# Patient Record
Sex: FEMALE | Race: WHITE | NOT HISPANIC OR LATINO | Employment: UNEMPLOYED | URBAN - METROPOLITAN AREA
[De-identification: names, ages, dates, MRNs, and addresses within clinical notes are randomized per-mention and may not be internally consistent; named-entity substitution may affect disease eponyms.]

---

## 2018-05-11 ENCOUNTER — DOCUMENTATION (OUTPATIENT)
Dept: BARIATRICS/WEIGHT MGMT | Facility: CLINIC | Age: 38
End: 2018-05-11

## 2018-05-11 RX ORDER — LEVOTHYROXINE SODIUM 0.03 MG/1
25 TABLET ORAL DAILY
COMMUNITY
End: 2018-05-22

## 2018-05-11 RX ORDER — FOLIC ACID 1 MG/1
1 TABLET ORAL DAILY
COMMUNITY
End: 2018-05-22

## 2018-05-11 RX ORDER — AMLODIPINE BESYLATE 10 MG/1
10 TABLET ORAL DAILY
COMMUNITY
End: 2018-05-22

## 2018-05-11 RX ORDER — FENOFIBRATE 54 MG/1
54 TABLET ORAL DAILY
COMMUNITY
End: 2018-05-22

## 2018-05-11 RX ORDER — LISINOPRIL AND HYDROCHLOROTHIAZIDE 25; 20 MG/1; MG/1
1 TABLET ORAL DAILY
COMMUNITY
End: 2018-05-22

## 2018-05-11 RX ORDER — ATORVASTATIN CALCIUM 20 MG/1
20 TABLET, FILM COATED ORAL DAILY
COMMUNITY
End: 2018-05-22

## 2018-05-11 RX ORDER — CYANOCOBALAMIN (VITAMIN B-12) 1000 MCG
500 TABLET, SUBLINGUAL SUBLINGUAL 3 TIMES DAILY
COMMUNITY

## 2018-05-11 RX ORDER — METFORMIN HYDROCHLORIDE 500 MG/1
500 TABLET, EXTENDED RELEASE ORAL
COMMUNITY
End: 2018-05-22

## 2018-05-11 RX ORDER — UREA 10 %
LOTION (ML) TOPICAL
COMMUNITY
End: 2018-05-22

## 2018-05-22 ENCOUNTER — DOCUMENTATION (OUTPATIENT)
Dept: BARIATRICS/WEIGHT MGMT | Facility: CLINIC | Age: 38
End: 2018-05-22

## 2018-05-22 ENCOUNTER — OFFICE VISIT (OUTPATIENT)
Dept: BARIATRICS/WEIGHT MGMT | Facility: CLINIC | Age: 38
End: 2018-05-22

## 2018-05-22 VITALS
HEART RATE: 74 BPM | RESPIRATION RATE: 18 BRPM | SYSTOLIC BLOOD PRESSURE: 98 MMHG | DIASTOLIC BLOOD PRESSURE: 64 MMHG | TEMPERATURE: 97.8 F | WEIGHT: 249 LBS | BODY MASS INDEX: 42.51 KG/M2 | HEIGHT: 64 IN | OXYGEN SATURATION: 99 %

## 2018-05-22 DIAGNOSIS — F32.A DEPRESSION, UNSPECIFIED DEPRESSION TYPE: ICD-10-CM

## 2018-05-22 DIAGNOSIS — R53.83 FATIGUE, UNSPECIFIED TYPE: ICD-10-CM

## 2018-05-22 DIAGNOSIS — R06.02 SHORTNESS OF BREATH: ICD-10-CM

## 2018-05-22 DIAGNOSIS — E06.3 HASHIMOTO'S DISEASE: ICD-10-CM

## 2018-05-22 DIAGNOSIS — F41.9 ANXIETY: ICD-10-CM

## 2018-05-22 DIAGNOSIS — E66.01 MORBID OBESITY WITH BMI OF 40.0-44.9, ADULT (HCC): ICD-10-CM

## 2018-05-22 DIAGNOSIS — K76.0 FATTY LIVER: ICD-10-CM

## 2018-05-22 DIAGNOSIS — R94.31 ABNORMAL ECG: Primary | ICD-10-CM

## 2018-05-22 DIAGNOSIS — M79.669 PAIN IN SHIN, UNSPECIFIED LATERALITY: ICD-10-CM

## 2018-05-22 DIAGNOSIS — R10.13 DYSPEPSIA: ICD-10-CM

## 2018-05-22 DIAGNOSIS — R06.00 DYSPNEA, UNSPECIFIED TYPE: Primary | ICD-10-CM

## 2018-05-22 DIAGNOSIS — Z87.891 FORMER SMOKER: ICD-10-CM

## 2018-05-22 DIAGNOSIS — R06.00 DYSPNEA, UNSPECIFIED TYPE: ICD-10-CM

## 2018-05-22 DIAGNOSIS — R73.09 ABNORMAL GLUCOSE: ICD-10-CM

## 2018-05-22 DIAGNOSIS — R29.818 SUSPECTED SLEEP APNEA: ICD-10-CM

## 2018-05-22 DIAGNOSIS — E04.9 THYROID GOITER: ICD-10-CM

## 2018-05-22 DIAGNOSIS — R12 HEARTBURN: ICD-10-CM

## 2018-05-22 PROCEDURE — 99204 OFFICE O/P NEW MOD 45 MIN: CPT | Performed by: PHYSICIAN ASSISTANT

## 2018-05-22 NOTE — PROGRESS NOTES
"Weight Loss Surgery  Presurgical Nutrition Assessment     Dusty Calhoun  05/22/2018  [id number]  [id number]  1980  female    Surgery desired: Sleeve Gastrectomy    Ht 161.3 cm (63.5 \"); Wt 113 kg (249 #); BMI 43.4  Past Medical History:   Diagnosis Date   • Abnormal ECG     followed by cardiology annually, negative workup up per patient.    • Abnormal glucose     Was on metformin for DM, d/c soda and A1C normalized.    • Anxiety    • Boil     h/o mulitple boils, never had I&D, no known MRSA   • Depression    • Dyspepsia    • Fatigue    • Fatty liver     noted on US, confirmed during lap jessika (no Biopsy)   • Former smoker    • Hashimoto's disease     last labs showed euthyroid without meds   • Heartburn     rare, doesn't take meds typically.  No h/o EGD or h pylori.    • Morbid obesity with BMI of 40.0-44.9, adult    • Pain in shin    • Shortness of breath    • Suspected sleep apnea     Has home oxygen testing, was on ngihttime oxygen in the past, has yet to have sleep study.    • Thyroid goiter     Followed by US annually, no procedures to date.      Past Surgical History:   Procedure Laterality Date   • LAPAROSCOPIC CHOLECYSTECTOMY  2014    cholelithiasis   • TUBAL ABDOMINAL LIGATION  2011    laparoscopic     No Known Allergies    Current Outpatient Prescriptions:   •  Cholecalciferol (VITAMIN D3) 2000 units capsule, Take 2,000 Units by mouth Daily., Disp: , Rfl:   •  vitamin B-12 (CYANOCOBALAMIN) 2500 MCG sublingual tablet tablet, Place  under the tongue Daily., Disp: , Rfl:       Nutrition Assessment    Estimated energy needs:  1800 kcal    Estimated calories for weight loss:  1300 kcal    IBW (Pounds):  145 #        Excess body weight (Pounds):  105 #       Nutrition Recall  24 Hour recall: (B) (L) (D) -  Reviewed and discussed with patient & her mother.  Usually eats no breakfast.  Gave up soda ~ 1 year ago. Drinks juice (from bottles /c no added sugar) and skim milk instead. Did not understand that the " sugar in juice is CHO and contributes to wt gain as does soda & sweet tea. Eats no brkfast. States that juggling the schedules of 3 children (oldest 15) makes it difficult to eat well.  Eats no brkfast.  Lunch was a Jr Whopper /c cheese & tyler.  9 pm dinner was 6 oz worth of chicken tenders & 5 potato wedges.         Exercise  daily - 30 minutes qd walking      Education    Provided information packet re:  Sleeve Gastrectomy  1. Reviewed guidelines for higher protein, limited carbohydrate diet to promote weight loss.  Encouraged patient to incorporate these principles of healthy eating from now until approximately 2 weeks prior to bariatric surgery date, when an even lower carbohydrate “liver-shrinking” regimen will be followed. (Information sheet re pre-op diet given).  Explained that after recovery from surgery this diet will again be followed to ensure further loss and for weight maintenance.    2. Encouraged patient to choose an acceptable protein supplement powder or shake for post-surgery liquid diet.  Provided product guidelines and examples.    3. Explained importance of goal setting to help in changing eating behaviors that are not conducive to weight loss.  Targeted several on a worksheet which also included spaces for patient to work on issues specific to them.  4. Provided follow-up options for support, including contact information for dietitians here, if desired.  Web-based support information and apps for smart phones and computers given.  Noted that monthly support group is offered at this clinic, and that support is associated with successful weight loss.    Recommend that team proceed with surgery and follow per protocol.      Nutrition Goals   Dietary Guidelines per information packet as described above  Protein goal:  grams per day   Carbohydrate goal:  100-140 grams per day  Eliminate soda, sweet tea, etc.     Exercise Goals  Continue current exercise routine   Add 15-30 minutes of  activity per day as tolerated      Shanta Joseph, SHEFALI  05/22/2018  4:28 PM

## 2018-05-22 NOTE — PROGRESS NOTES
[facility] BARIATRIC SURGERY  [address]  [facility] [id number] [phone]      Patient  Name:  Dusty Calhoun  :  1980      Date of Visit: 2018      Chief Complaint:  weight gain; unable to maintain weight loss    History of Present Illness:  Dusty Calhoun is a 37 y.o. female who presents today for evaluation, education and consultation regarding weight loss surgery. The patient is interested in sleeve gastrectomy.     Dusty has been overweight for at least 20 years, has been 35 pounds or more overweight for at least 15 years, has been 100 pounds or more overweight for 10 or more years and started dieting at age 13.      Previous diet attempts include: Calorie Counting; None; Amphetamines and Phenteramine.  The most weight Dusty lost was 40 pounds on  diet but was only able to maintain that weight loss for 6 months.  Her maximum lifetime weight is 249 pounds.    As above, patient has been overweight for many years, with numerous failed dietary/weight loss attempts.  She now has obesity related comorbidities and as such has decided to pursue weight loss surgery.    She is ready to make a life change to lose weight and better her health, gain mobility. Does know several people who have done well with the gastric sleeve.     Was diagnosed with HTN, HLD, DM, hashimotos and on several medications. She stopped drinking pop, lost 10lb and was taken off all these meds. Thyroid labs showed euthyroid off meds, BP has been controlled, cholesterol down and A1C normal.   Now only on vitamins.      GI history: occasional heartburn, does not require meds. Fatty liver noted on US and confirmed during lap jessika (no biopsy).      Did have abnormal ECG, advised to follow up with cardiology annually. Has had negative workup per patient.    Suspected sleep apnea with abnormal home pulse ox study, had been on oxygen at night but was non compliant. Plans to have overnight  sleep study in the future.     All past medical, surgical, social and family history have been obtained and discussed as pertinent to bariatric surgery as below.       Past Medical History:   Diagnosis Date   • Abnormal ECG     followed by cardiology annually, negative workup up per patient.    • Abnormal glucose     Was on metformin for DM, d/c soda and A1C normalized.    • Anxiety    • Boil     h/o mulitple boils, never had I&D, no known MRSA   • Depression    • Dyspepsia    • Fatigue    • Fatty liver     noted on US, confirmed during lap jessika (no Biopsy)   • Former smoker    • Hashimoto's disease     last labs showed euthyroid without meds   • Heartburn     rare, doesn't take meds typically.  No h/o EGD or h pylori.    • Morbid obesity with BMI of 40.0-44.9, adult    • Pain in shin    • Shortness of breath    • Suspected sleep apnea     Has home oxygen testing, was on ngihttime oxygen in the past, has yet to have sleep study.    • Thyroid goiter     Followed by US annually, no procedures to date.      Past Surgical History:   Procedure Laterality Date   • LAPAROSCOPIC CHOLECYSTECTOMY  2014    cholelithiasis   • TUBAL ABDOMINAL LIGATION  2011    laparoscopic       No Known Allergies    Current Outpatient Prescriptions:   •  Cholecalciferol (VITAMIN D3) 2000 units capsule, Take 2,000 Units by mouth Daily., Disp: , Rfl:   •  vitamin B-12 (CYANOCOBALAMIN) 2500 MCG sublingual tablet tablet, Place  under the tongue Daily., Disp: , Rfl:     Social History     Social History   • Marital status: Single     Spouse name: N/A   • Number of children: 3   • Years of education: High School      Occupational History   • Student      Social History Main Topics   • Smoking status: Former Smoker     Packs/day: 2.00     Types: Cigarettes   • Smokeless tobacco: Never Used   • Alcohol use No   • Drug use: No   • Sexual activity: Not on file      Comment: no hormones     Other Topics Concern   • Not on file     Social History  Narrative    Lives in [facility] with 3 children.  Not working, in school for associates in Science and Art.      Family History   Problem Relation Age of Onset   • Hypertension Mother    • Hypertension Father    • Diabetes Maternal Grandmother    • Obesity Maternal Grandmother    • Heart attack Maternal Grandfather    • Breast cancer Paternal Grandmother        Review of Systems:  Constitutional:  Reports fatigue, weight gain and denies fevers, chills.  HEENT:  denies headache, ear pain or loss of hearing, blurred or double vision, nasal discharge or sore throat.does note floaters intermittently.   Cardiovascular:  Reports edema, abnormal ECG and denies HTN, HLD, CAD, Atrial Fib, hx heart disease, heart murmur, hx MI, chest pain, palpitations, hx DVT.  Respiratory:  Reports shortness of breath  and denies dyspnea on exertion, cough , wheezing, sleep apnea, asthma, hx PE.  Gastrointestinal:  Reports heartburn, constipation, gallbladder issues, liver disease and denies dysphagia, nausea, vomiting, abdominal pain, IBS, diarrhea, melena, blood in stool, hx pancreatitis.  Genitourinary:  Reports stress incontinence and denies hematuria, dysuria, polyuria, polydipsia, renal insufficiency, renal failure.    Musculoskeletal:  Reports shin pain and denies joint pain, fibromyalgia, arthritis and autoimmune disease.  Neurological:  Reports none and denies headaches, migraines, numbness /tingling, dizziness, confusion, seizure, stroke.  Psychiatric:  Reports anxiety, depression and denies bipolar disorder, hx suicide attempt, hx self injury, eating disorder.  Endocrine:  Reports diabetes, thyroid disease and denies gout.  Hematologic:  Reports none and denies anemia, bleeding disorder, hx blood transfusion.  Skin: Occasional boils, no known MRSA.       Physical Exam:  Vital Signs:  Weight: 113 kg (249 lb)   Body mass index is 43.42 kg/m².  Temp: 97.8 °F (36.6 °C)   Heart Rate: 74   BP: 98/64     Physical Exam    Constitutional: She is oriented to person, place, and time. She appears well-developed and well-nourished.   HENT:   Head: Normocephalic and atraumatic.   Mouth/Throat: Oropharynx is clear and moist.   Eyes: EOM are normal.   exophthalmos    Neck: Normal range of motion. Neck supple. Thyromegaly present.   Cardiovascular: Normal rate, regular rhythm and normal heart sounds.    Pulmonary/Chest: Effort normal and breath sounds normal. No respiratory distress. She has no wheezes.   Abdominal: Soft. Bowel sounds are normal. She exhibits no distension. There is no tenderness.   Lap scars.    Musculoskeletal: Normal range of motion.   Neurological: She is alert and oriented to person, place, and time.   Skin: Skin is warm and dry.   Psychiatric: She has a normal mood and affect. Her behavior is normal. Judgment and thought content normal.   Vitals reviewed.      Patient Active Problem List   Diagnosis   • Hashimoto's disease   • Thyroid goiter   • Fatty liver   • Dyspepsia   • Fatigue   • Morbid obesity with BMI of 40.0-44.9, adult   • Pain in shin   • Shortness of breath   • Heartburn   • Abnormal ECG   • Anxiety   • Depression   • Boil   • Former smoker   • Suspected sleep apnea   • Abnormal glucose       Assessment:    Dusty Calhoun is a 37 y.o. year old female with medically complicated obesity pursuing sleeve gastrectomy.    Weight loss surgery is deemed medically necessary given the following obesity related comorbidities including sleep disturbances with possible sleep apnea, diabetes, GERD, gall bladder disease and depression with current Weight: 113 kg (249 lb) and Body mass index is 43.42 kg/m²..    Plan:  The consultation plan and program requirements were reviewed with the patient.  The patient has been advised that a letter of medical support must be obtained from her primary care physician or referring provider. A psychological evaluation will be arranged.  A nutritional evaluation will be performed.  The  patient was advised to start a high protein and low carbohydrate diet.  Necessary lifestyle modifications were discussed.  Instructions on how to access SMILEY was given to the patient.  SMILEY is an internet based educational video that explains the surgical procedure chosen and answers basic questions regarding that procedure.     Preoperative testing will include: CBC, CMP, Lipids, TSH, HgA1C, H.Pylori, Pulmonary Function Testing, CXR, EKG and EGD     Additional preop clearances required prior to surgery: Cardiac.      The patient has been educated on expected postoperative lifestyle changes, including commitment to high protein diet, vitamin regimen, and exercise program.  They are aware that support groups are encouraged for optimal weight loss results. Patient understands that bariatric surgery is not cosmetic surgery but rather a tool to help make a lifelong commitment to lifestyle changes including diet, exercise, behavior modifications, and healthy habits. The procedure was discussed with the patient and all questions were answered. The importance of avoiding ASA/ NSAIDS/ steroids/ tobacco/ hormones/ immunomodulators perioperatively was discussed.         Marielena Thao PA-C

## 2018-05-23 LAB
ALBUMIN SERPL-MCNC: 4.8 G/DL (ref 3.5–5.5)
ALBUMIN/GLOB SERPL: 1.8 {RATIO} (ref 1.2–2.2)
ALP SERPL-CCNC: 69 IU/L (ref 39–117)
ALT SERPL-CCNC: 27 IU/L (ref 0–32)
AST SERPL-CCNC: 40 IU/L (ref 0–40)
BILIRUB SERPL-MCNC: 0.6 MG/DL (ref 0–1.2)
BUN SERPL-MCNC: 12 MG/DL (ref 6–20)
BUN/CREAT SERPL: 10 (ref 9–23)
CALCIUM SERPL-MCNC: 9.5 MG/DL (ref 8.7–10.2)
CHLORIDE SERPL-SCNC: 100 MMOL/L (ref 96–106)
CHOLEST SERPL-MCNC: 247 MG/DL (ref 100–199)
CO2 SERPL-SCNC: 25 MMOL/L (ref 18–29)
CREAT SERPL-MCNC: 1.15 MG/DL (ref 0.57–1)
ERYTHROCYTE [DISTWIDTH] IN BLOOD BY AUTOMATED COUNT: 14.7 % (ref 12.3–15.4)
GFR SERPLBLD CREATININE-BSD FMLA CKD-EPI: 62 ML/MIN/1.73
GFR SERPLBLD CREATININE-BSD FMLA CKD-EPI: 71 ML/MIN/1.73
GLOBULIN SER CALC-MCNC: 2.6 G/DL (ref 1.5–4.5)
GLUCOSE SERPL-MCNC: 108 MG/DL (ref 65–99)
HBA1C MFR BLD: 5.5 % (ref 4.8–5.6)
HCT VFR BLD AUTO: 41.8 % (ref 34–46.6)
HDLC SERPL-MCNC: 43 MG/DL
HGB BLD-MCNC: 14.2 G/DL (ref 11.1–15.9)
LDLC SERPL CALC-MCNC: 168 MG/DL (ref 0–99)
MCH RBC QN AUTO: 29.8 PG (ref 26.6–33)
MCHC RBC AUTO-ENTMCNC: 34 G/DL (ref 31.5–35.7)
MCV RBC AUTO: 88 FL (ref 79–97)
PLATELET # BLD AUTO: 333 X10E3/UL (ref 150–379)
POTASSIUM SERPL-SCNC: 4.8 MMOL/L (ref 3.5–5.2)
PROT SERPL-MCNC: 7.4 G/DL (ref 6–8.5)
RBC # BLD AUTO: 4.76 X10E6/UL (ref 3.77–5.28)
SODIUM SERPL-SCNC: 142 MMOL/L (ref 134–144)
TRIGL SERPL-MCNC: 182 MG/DL (ref 0–149)
TSH SERPL DL<=0.005 MIU/L-ACNC: 96.1 UIU/ML (ref 0.45–4.5)
UREA BREATH TEST QL: NEGATIVE
VLDLC SERPL CALC-MCNC: 36 MG/DL (ref 5–40)
WBC # BLD AUTO: 9.6 X10E3/UL (ref 3.4–10.8)

## 2018-07-23 ENCOUNTER — OFFICE VISIT (OUTPATIENT)
Dept: BARIATRICS/WEIGHT MGMT | Facility: CLINIC | Age: 38
End: 2018-07-23

## 2018-07-23 VITALS
RESPIRATION RATE: 18 BRPM | BODY MASS INDEX: 41.23 KG/M2 | HEART RATE: 84 BPM | OXYGEN SATURATION: 99 % | SYSTOLIC BLOOD PRESSURE: 122 MMHG | HEIGHT: 64 IN | DIASTOLIC BLOOD PRESSURE: 100 MMHG | TEMPERATURE: 98.4 F | WEIGHT: 241.5 LBS

## 2018-07-23 DIAGNOSIS — E66.01 OBESITY, CLASS III, BMI 40-49.9 (MORBID OBESITY) (HCC): ICD-10-CM

## 2018-07-23 DIAGNOSIS — R12 HEARTBURN: Primary | ICD-10-CM

## 2018-07-23 PROCEDURE — 99214 OFFICE O/P EST MOD 30 MIN: CPT | Performed by: PHYSICIAN ASSISTANT

## 2018-07-23 RX ORDER — LEVOTHYROXINE SODIUM 0.05 MG/1
50 TABLET ORAL DAILY
COMMUNITY
End: 2019-02-25

## 2018-07-23 RX ORDER — ESCITALOPRAM OXALATE 10 MG/1
10 TABLET ORAL DAILY
COMMUNITY
End: 2019-03-10

## 2018-07-23 RX ORDER — LISINOPRIL 10 MG/1
10 TABLET ORAL DAILY
COMMUNITY
End: 2019-10-22

## 2018-07-23 NOTE — PROGRESS NOTES
[facility] BARIATRIC SURGERY  [address]  [facility] [id number] [phone]      Patient  Name:  Dusty Calhoun  :  1980      Date of Visit: 2018      Chief Complaint:  weight gain; unable to maintain weight loss, heartburn, diet visit #2    History of Present Illness:  Dusty Calhoun is a 38 y.o. female who presents today for evaluation of heartburn prior to sleeve gastrectomy as well as diet visit.      Since LOV, was started on levothyroxine 50mcg for TSH of 96.  Also started on lisinopril for HTN. PCP plans to recheck labs in 3 months.     GI history: occasional heartburn, does not require meds. Fatty liver noted on US and confirmed during lap jessika (no biopsy).   H pylori was negative with recent UBT. No h/o EGD.Continues with occasional heartburn. Denies n/v/abd pain, dysphagia. Has chronic constipation.   No change in other medical history.     Focusing on increasing protein and decreasing carbs, cutting out juice and still not drinking pop.  Drinking 64oz+ daily. Eating 2-3 meals a day, drinking protein shake for breakfast.  Lunch/ dinner: making substitutions such as using olive oil, eating a lot of chicken breasts.  . Eating turkey roll ups and fruits for snacks. Not tracking intake.  Exercising: walking/ treadmill.           Past Medical History:   Diagnosis Date   • Abnormal ECG     followed by cardiology annually, negative workup up per patient.    • Abnormal glucose     Was on metformin for DM, d/c soda and A1C normalized.    • Anxiety    • Boil     h/o mulitple boils, never had I&D, no known MRSA   • Depression    • Dyspepsia    • Fatigue    • Fatty liver     noted on US, confirmed during lap jessika (no Biopsy)   • Former smoker    • Hashimoto's disease     last labs showed euthyroid without meds   • Heartburn     rare, doesn't take meds typically.  No h/o EGD or h pylori.    • Hypertension    • Morbid obesity with BMI of 40.0-44.9, adult (CMS/[facility])    •  Pain in shin    • Shortness of breath    • Suspected sleep apnea     Has home oxygen testing, was on ngihttime oxygen in the past, has yet to have sleep study.    • Thyroid goiter     Followed by US annually, no procedures to date.      Past Surgical History:   Procedure Laterality Date   • LAPAROSCOPIC CHOLECYSTECTOMY  2014    cholelithiasis   • TUBAL ABDOMINAL LIGATION  2011    laparoscopic       No Known Allergies    Current Outpatient Prescriptions:   •  Cholecalciferol (VITAMIN D3) 2000 units capsule, Take 5,000 Units by mouth Daily., Disp: , Rfl:   •  escitalopram (LEXAPRO) 10 MG tablet, Take 10 mg by mouth Daily., Disp: , Rfl:   •  levothyroxine (SYNTHROID, LEVOTHROID) 50 MCG tablet, Take 50 mcg by mouth Daily., Disp: , Rfl:   •  lisinopril (PRINIVIL,ZESTRIL) 10 MG tablet, Take 10 mg by mouth Daily., Disp: , Rfl:   •  vitamin B-12 (CYANOCOBALAMIN) 2500 MCG sublingual tablet tablet, Place 500 mcg under the tongue 3 (Three) Times a Day., Disp: , Rfl:     Social History     Social History   • Marital status: Single     Spouse name: N/A   • Number of children: 3   • Years of education: High School      Occupational History   • Student      Social History Main Topics   • Smoking status: Former Smoker     Packs/day: 2.00     Types: Cigarettes   • Smokeless tobacco: Never Used   • Alcohol use No   • Drug use: No   • Sexual activity: Not on file      Comment: no hormones     Other Topics Concern   • Not on file     Social History Narrative    Lives in [facility] with 3 children.  Not working, in school for associates in Science and Art.      Family History   Problem Relation Age of Onset   • Hypertension Mother    • Hypertension Father    • Diabetes Maternal Grandmother    • Obesity Maternal Grandmother    • Heart attack Maternal Grandfather    • Breast cancer Paternal Grandmother        Review of Systems:  Constitutional:  Reports fatigue, weight gain and denies fevers, chills.  HEENT:  denies headache, ear pain  or loss of hearing, blurred or double vision, nasal discharge or sore throat.does note floaters intermittently.   Cardiovascular:  Reports edema, abnormal ECG and denies HTN, HLD, CAD, Atrial Fib, hx heart disease, heart murmur, hx MI, chest pain, palpitations, hx DVT.  Respiratory:  Reports shortness of breath  and denies dyspnea on exertion, cough , wheezing, sleep apnea, asthma, hx PE.  Gastrointestinal:  Reports heartburn, constipation, gallbladder issues, liver disease and denies dysphagia, nausea, vomiting, abdominal pain, IBS, diarrhea, melena, blood in stool, hx pancreatitis.  Genitourinary:  Reports stress incontinence and denies hematuria, dysuria, polyuria, polydipsia, renal insufficiency, renal failure.    Musculoskeletal:  Reports shin pain and denies joint pain, fibromyalgia, arthritis and autoimmune disease.  Neurological:  Reports none and denies headaches, migraines, numbness /tingling, dizziness, confusion, seizure, stroke.  Psychiatric:  Reports anxiety, depression and denies bipolar disorder, hx suicide attempt, hx self injury, eating disorder.  Endocrine:  Reports diabetes, thyroid disease and denies gout.  Hematologic:  Reports none and denies anemia, bleeding disorder, hx blood transfusion.  Skin: Occasional boils, no known MRSA.     ROS with no change on today's visit.     Physical Exam:  Vital Signs:  Weight: 110 kg (241 lb 8 oz)   Body mass index is 42.11 kg/m².  Temp: 98.4 °F (36.9 °C)   Heart Rate: 84   BP: 122/100     Physical Exam   Constitutional: She is oriented to person, place, and time. She appears well-developed and well-nourished.   HENT:   Head: Normocephalic and atraumatic.   Mouth/Throat: Oropharynx is clear and moist.   exophthalmos   Eyes: EOM are normal.   Neck: Normal range of motion. Neck supple. Thyromegaly present.   Cardiovascular: Normal rate, regular rhythm and normal heart sounds.    Pulmonary/Chest: Effort normal and breath sounds normal. No respiratory distress.  She has no wheezes.   Abdominal: Soft. Bowel sounds are normal. She exhibits no distension. There is no tenderness.   Lap scars   Musculoskeletal: Normal range of motion.   Neurological: She is alert and oriented to person, place, and time.   Skin: Skin is warm and dry.   Psychiatric: She has a normal mood and affect. Her behavior is normal.   Vitals reviewed.      Patient Active Problem List   Diagnosis   • Hashimoto's disease   • Thyroid goiter   • Fatty liver   • Dyspepsia   • Fatigue   • Morbid obesity with BMI of 40.0-44.9, adult (CMS/[facility])   • Pain in shin   • Shortness of breath   • Heartburn   • Abnormal ECG   • Anxiety   • Depression   • Boil   • Former smoker   • Suspected sleep apnea   • Abnormal glucose       Assessment:    Dusty Calhoun is a 38 y.o. year old female with medically complicated obesity pursuing sleeve gastrectomy.    Weight loss surgery is deemed medically necessary given the following obesity related comorbidities including sleep disturbances with possible sleep apnea, diabetes, GERD, gall bladder disease and depression with current Weight: 110 kg (241 lb 8 oz) and Body mass index is 42.11 kg/m²..      ICD-10-CM ICD-9-CM   1. Heartburn R12 787.1   2. Obesity, Class III, BMI 40-49.9 (morbid obesity) (CMS/[facility]) E66.01 278.01         Plan:      During diet appointments the patient is educated on high quality nutrition and habits to facilitate good health and possibly some weight loss. Necessary lifestyle changes and behavior modifications were discussed. Please note, the patient remains compliant in completing her diet requirements.     Goals:   1. Continue to be mindful of healthy food choices, encouraged 3 meals a day with snack betwee- healthy options  2. Increase daily protein intake and reduce carbs, goal of protein 70-100g daily, start tracking intake.   3. Increase daily exercise/activity as able.- 30 min, 3 times a day.     Proceed with EGD for evaluation of heartburn.       Marielena  VIPIN Thao PA-C

## 2018-07-26 ENCOUNTER — LAB REQUISITION (OUTPATIENT)
Dept: LAB | Facility: HOSPITAL | Age: 38
End: 2018-07-26

## 2018-07-26 ENCOUNTER — OUTSIDE FACILITY SERVICE (OUTPATIENT)
Dept: BARIATRICS/WEIGHT MGMT | Facility: CLINIC | Age: 38
End: 2018-07-26

## 2018-07-26 DIAGNOSIS — R12 HEARTBURN: ICD-10-CM

## 2018-07-26 PROCEDURE — 88305 TISSUE EXAM BY PATHOLOGIST: CPT | Performed by: SURGERY

## 2018-07-26 PROCEDURE — 43239 EGD BIOPSY SINGLE/MULTIPLE: CPT | Performed by: SURGERY

## 2018-07-27 DIAGNOSIS — R12 HEARTBURN: ICD-10-CM

## 2018-07-27 LAB
CYTO UR: NORMAL
LAB AP CASE REPORT: NORMAL
LAB AP CLINICAL INFORMATION: NORMAL
PATH REPORT.FINAL DX SPEC: NORMAL
PATH REPORT.GROSS SPEC: NORMAL

## 2019-02-21 DIAGNOSIS — R53.83 FATIGUE, UNSPECIFIED TYPE: ICD-10-CM

## 2019-02-21 DIAGNOSIS — R06.00 DYSPNEA, UNSPECIFIED TYPE: Primary | ICD-10-CM

## 2019-02-21 DIAGNOSIS — R06.00 DYSPNEA, UNSPECIFIED TYPE: ICD-10-CM

## 2019-02-25 ENCOUNTER — CONSULT (OUTPATIENT)
Dept: BARIATRICS/WEIGHT MGMT | Facility: CLINIC | Age: 39
End: 2019-02-25

## 2019-02-25 ENCOUNTER — PREP FOR SURGERY (OUTPATIENT)
Dept: OTHER | Facility: HOSPITAL | Age: 39
End: 2019-02-25

## 2019-02-25 VITALS
DIASTOLIC BLOOD PRESSURE: 74 MMHG | HEART RATE: 78 BPM | OXYGEN SATURATION: 99 % | WEIGHT: 238.5 LBS | BODY MASS INDEX: 40.72 KG/M2 | SYSTOLIC BLOOD PRESSURE: 124 MMHG | HEIGHT: 64 IN | TEMPERATURE: 98.3 F | RESPIRATION RATE: 18 BRPM

## 2019-02-25 DIAGNOSIS — R53.83 FATIGUE, UNSPECIFIED TYPE: Primary | ICD-10-CM

## 2019-02-25 DIAGNOSIS — F32.A DEPRESSION, UNSPECIFIED DEPRESSION TYPE: ICD-10-CM

## 2019-02-25 DIAGNOSIS — R12 HEARTBURN: ICD-10-CM

## 2019-02-25 DIAGNOSIS — E66.01 OBESITY, CLASS III, BMI 40-49.9 (MORBID OBESITY) (HCC): ICD-10-CM

## 2019-02-25 DIAGNOSIS — E66.01 OBESITY, CLASS III, BMI 40-49.9 (MORBID OBESITY) (HCC): Primary | ICD-10-CM

## 2019-02-25 DIAGNOSIS — R29.818 SUSPECTED SLEEP APNEA: ICD-10-CM

## 2019-02-25 DIAGNOSIS — K76.0 FATTY LIVER: ICD-10-CM

## 2019-02-25 PROCEDURE — 99215 OFFICE O/P EST HI 40 MIN: CPT | Performed by: SURGERY

## 2019-02-25 RX ORDER — SCOLOPAMINE TRANSDERMAL SYSTEM 1 MG/1
1 PATCH, EXTENDED RELEASE TRANSDERMAL ONCE
Status: CANCELLED | OUTPATIENT
Start: 2019-02-25 | End: 2019-02-25

## 2019-02-25 RX ORDER — CEFAZOLIN SODIUM 2 G/100ML
2 INJECTION, SOLUTION INTRAVENOUS
Status: CANCELLED | OUTPATIENT
Start: 2019-02-25

## 2019-02-25 RX ORDER — CHLORHEXIDINE GLUCONATE 0.12 MG/ML
15 RINSE ORAL
Status: CANCELLED | OUTPATIENT
Start: 2019-02-25 | End: 2019-02-25

## 2019-02-25 RX ORDER — PANTOPRAZOLE SODIUM 40 MG/10ML
40 INJECTION, POWDER, LYOPHILIZED, FOR SOLUTION INTRAVENOUS ONCE
Status: CANCELLED | OUTPATIENT
Start: 2019-02-25 | End: 2019-02-25

## 2019-02-25 RX ORDER — SODIUM CHLORIDE 9 MG/ML
150 INJECTION, SOLUTION INTRAVENOUS CONTINUOUS
Status: CANCELLED | OUTPATIENT
Start: 2019-02-25

## 2019-02-25 NOTE — PROGRESS NOTES
[facility] GROUP BARIATRIC SURGERY  [address]  [facility] [id number] [phone]      Patient  Name:  Dusty Calhoun  :  1980      Date of Visit: 2019      Chief Complaint:  weight gain; unable to maintain weight loss    History of Present Illness:  Dusty Calhoun is a 38 y.o. female who presents today for evaluation, education and consultation regarding weight loss surgery.     Patient has been overweight for many years, with numerous failed dietary/weight loss attempts.  She now has chronic stable obesity related comorbidities of fatty liver, GERD, fatigue, depression, suspected sleep apnea and as such has decided to pursue weight loss surgery.    Hx abnormal EKG, sees cardiologist yearly and normal stress test yearly.      Previously DM2, but stopped drinking mountain dew 20 oz x 6 per day and came off of metformin with improvement in A1C.      Some heartburn in the past, very infrequent, no meds.  Supposed to use O2 at home, does not.  A home O2 sat study showed sleep apnea.  They did not recommend CPAP machine.      No longer takes Lexapro.      Original intake  18      Review of data:    ARASH: nothing  CBC: nl  CMP: nl  EGD: PQ 37 cm, small HH, DE reflux  HP neg  Cardiac clearance: no contraindication  Echo: 2016 normal  Stress test: 2016 normal  PFTs: mild obstructive  EKG: nl  CXR: nl      Last tobacco: 7 year ago  Last NSAIDs: >1 month  Last ASA: n/a  Last steroids: n/a  Last hormones: n/a      Past Medical History:   Diagnosis Date   • Abnormal ECG     followed by cardiology annually, negative workup up per patient.    • Abnormal glucose     Was on metformin for DM, d/c soda and A1C normalized.    • Anxiety    • Boil     h/o mulitple boils, never had I&D, no known MRSA   • Depression    • Dyspepsia    • Fatigue    • Fatty liver     noted on US, confirmed during lap jessika (no Biopsy)   • Former smoker    • Hashimoto's disease     last labs showed  euthyroid without meds   • Heartburn     rare, doesn't take meds typically.  No h/o EGD or h pylori.    • Hypertension    • Morbid obesity with BMI of 40.0-44.9, adult (CMS/HCC)    • Pain in shin    • Shortness of breath    • Suspected sleep apnea     Has home oxygen testing, was on ngihttime oxygen in the past, has yet to have sleep study.    • Thyroid goiter     Followed by US annually, no procedures to date.      Past Surgical History:   Procedure Laterality Date   • LAPAROSCOPIC CHOLECYSTECTOMY  2014    cholelithiasis   • TUBAL ABDOMINAL LIGATION  2011    laparoscopic       No Known Allergies    Current Outpatient Medications:   •  Cholecalciferol (VITAMIN D3) 2000 units capsule, Take 5,000 Units by mouth Daily., Disp: , Rfl:   •  escitalopram (LEXAPRO) 10 MG tablet, Take 10 mg by mouth Daily., Disp: , Rfl:   •  lisinopril (PRINIVIL,ZESTRIL) 10 MG tablet, Take 10 mg by mouth Daily., Disp: , Rfl:   •  vitamin B-12 (CYANOCOBALAMIN) 2500 MCG sublingual tablet tablet, Place 500 mcg under the tongue 3 (Three) Times a Day., Disp: , Rfl:     Social History     Socioeconomic History   • Marital status: Single     Spouse name: Not on file   • Number of children: 3   • Years of education: High School    • Highest education level: Not on file   Social Needs   • Financial resource strain: Not on file   • Food insecurity - worry: Not on file   • Food insecurity - inability: Not on file   • Transportation needs - medical: Not on file   • Transportation needs - non-medical: Not on file   Occupational History   • Occupation: Student   Tobacco Use   • Smoking status: Former Smoker     Packs/day: 2.00     Types: Cigarettes   • Smokeless tobacco: Never Used   Substance and Sexual Activity   • Alcohol use: No   • Drug use: No   • Sexual activity: Not on file     Comment: no hormones   Other Topics Concern   • Not on file   Social History Narrative    Lives in [facility] with 3 children.  Not working, in school for associates in  Science and Art.      Family History   Problem Relation Age of Onset   • Hypertension Mother    • Hypertension Father    • Diabetes Maternal Grandmother    • Obesity Maternal Grandmother    • Heart attack Maternal Grandfather    • Breast cancer Paternal Grandmother        Review of Systems   Constitutional: Positive for fatigue and unexpected weight gain. Negative for chills, diaphoresis, fever and unexpected weight loss.   HENT: Negative for congestion, ear pain, facial swelling, rhinorrhea and sore throat.    Eyes: Negative for blurred vision, double vision and discharge.   Respiratory: Positive for shortness of breath. Negative for chest tightness, wheezing and stridor.    Cardiovascular: Negative for chest pain, palpitations and leg swelling.   Gastrointestinal: Negative for abdominal pain, blood in stool and vomiting.   Endocrine: Negative for polydipsia.   Genitourinary: Negative for hematuria.   Musculoskeletal: Positive for arthralgias. Negative for neck pain.   Skin: Negative for color change and rash.   Allergic/Immunologic: Negative for immunocompromised state.   Neurological: Negative for confusion.   Psychiatric/Behavioral: Negative for self-injury.       Physical Exam:  Vital Signs:  Weight: 108 kg (238 lb 8 oz)   Body mass index is 41.59 kg/m².  Temp: 98.3 °F (36.8 °C)   Heart Rate: 78   BP: 124/74     Physical Exam   Constitutional: She is oriented to person, place, and time. She appears well-developed and well-nourished.   HENT:   Head: Normocephalic and atraumatic.   Nose: Nose normal.   Eyes: Conjunctivae and EOM are normal. Pupils are equal, round, and reactive to light.   Neck: Normal range of motion. Neck supple. Carotid bruit is not present. No tracheal deviation present.   Thyroid slightly full on the left   Cardiovascular: Normal rate, regular rhythm and normal heart sounds.   Pulmonary/Chest: Effort normal and breath sounds normal. No respiratory distress.   Abdominal: Soft. She exhibits no  distension. There is no hepatosplenomegaly. There is no tenderness.   Scattered lap scars   Musculoskeletal: Normal range of motion. She exhibits no edema or deformity.   Neurological: She is alert and oriented to person, place, and time. No cranial nerve deficit. Coordination normal.   Skin: Skin is warm and dry. No rash noted.   Psychiatric: She has a normal mood and affect. Her behavior is normal. Judgment and thought content normal.   Vitals reviewed.      Patient Active Problem List   Diagnosis   • Hashimoto's disease   • Thyroid goiter   • Fatty liver   • Dyspepsia   • Fatigue   • Morbid obesity with BMI of 40.0-44.9, adult (CMS/HCC)   • Pain in shin   • Shortness of breath   • Heartburn   • Abnormal ECG   • Anxiety   • Depression   • Boil   • Former smoker   • Suspected sleep apnea   • Abnormal glucose       Assessment:    Dusty Calhoun is a 38 y.o. year old female with medically complicated obesity.    Weight loss surgery is deemed medically necessary given the following obesity related comorbidities including fatty liver, GERD, fatigue, depression, suspected sleep apnea  with current Weight: 108 kg (238 lb 8 oz) and Body mass index is 41.59 kg/m²..    Patient is aware that surgery is a tool, and that weight loss is not guaranteed but only seen in the context of appropriate use, follow up and exercise.    The patient was present for an approximately a 2.5 hour discussion of the purpose of weight loss surgery, how WLS is a tool to assist in achieving weight loss goals, the most common complications and how best to avoid them, and the strategies for short and long term weight loss.  Ample opportunity to discuss questions was available both in group and during the time of individual examination.    I reviewed all available preop labs, Xrays, tests, clearances, etc and signed off on these in the record.  All of this in addition to the patient's unique history and exam has been taken into consideration in  "determining their appropriate candidacy for weight loss surgery.    Complications  of laparoscopic/possible robotic gastric sleeve were discussed. The patient is well aware of the potential complications of surgery that include but not limited to bleeding, infections, deep venous thrombosis, pulmonary embolism, pulmonary complications such as pneumonia, cardiac events, hernias, small bowel obstruction, damage to the spleen or other organs, bowel injury, disfiguring scars, failure to lose weight, need for additional surgery, conversion to an open procedure, and death. Patient is also aware of complications which apply in this particular procedure that can include but are not limited to a \"leak\" at the staple line which in some instances may require conversion to gastric bypass.    The patient is aware if a hiatal hernia is encountered, it likely will be repaired.  R/B/A Rx to hiatal hernia repair were discussed as outlined in our long consent form.  Briefly risks in addition to those for LSG include recurrent hernia, RACHAEL, dysphagia, esophageal injury, pneumothorax, injury to the vagus nerves, injury to the thoracic duct, aorta or vena cava.    Greater than 3 minutes was spent with the patient discussing avoiding all tobacco products and second hand smoke at least 2 weeks pre-operatively and 6 weeks post-operatively to minimize the risk of sleeve leak.  This included discussing the importance of avoiding even secondhand smoke as the risk of leak is increased.  Examples discussed:  I made it very clear that the patient understands they should avoid even riding in a car where someone has previously smoked in the last 2 weeks, living in a house where someone smokes (even if it's in a separate room/patio/attached garage, etc.) we discussed that they should not have a conversation with a group of people who are smoking even if it's outside.  They can be around wood burning fires and barbecue.  I told them I do not know if " marijuana has a same effects but my overall recommendation is to avoid it for 2 weeks prior in 6 weeks after surgery.  They also are aware that nicotine may also increase the risk of leak and I strongly encouraged him to avoid that as well for 2 weeks prior in 6 weeks after surgery.    Discussed the risks, benefits and alternative therapies at great length as outlined in our extensive consent forms, consent videos, and educational teaching process under the direction of the center's .    A copy of the patient's signed informed consent is on file.    Plan:  Laparoscopic sleeve gastrectomy + HHR at  [facility].   Probably undiagnosed sleep apnea.      R/B/A Rx discussed to postop anticoagulation incl but not limited to bleeding, drug reaction, venothromboembolic events, etc. and she declined.  No personal or family hx of VTE, no hx MI.      MDM high:  Elective procedure with the following risk factors: morbid obesity  4+ chronic medical problems reviewed.        Joselin Fish MD

## 2019-02-25 NOTE — H&P
[facility] GROUP BARIATRIC SURGERY  [address]  [facility] [address] [phone]      Patient  Name:  Dusty Calhoun  :  1980      Date of Visit: 2019      Chief Complaint:  weight gain; unable to maintain weight loss    History of Present Illness:  Dusty Calhoun is a 38 y.o. female who presents today for evaluation, education and consultation regarding weight loss surgery.     Patient has been overweight for many years, with numerous failed dietary/weight loss attempts.  She now has chronic stable obesity related comorbidities of fatty liver, GERD, fatigue, depression, suspected sleep apnea and as such has decided to pursue weight loss surgery.    Hx abnormal EKG, sees cardiologist yearly and normal stress test yearly.      Previously DM2, but stopped drinking mountain dew 20 oz x 6 per day and came off of metformin with improvement in A1C.      Some heartburn in the past, very infrequent, no meds.  Supposed to use O2 at home, does not.  A home O2 sat study showed sleep apnea.  They did not recommend CPAP machine.      No longer takes Lexapro.      Original intake  18      Review of data:    ARASH: nothing  CBC: nl  CMP: nl  EGD: PQ 37 cm, small HH, DE reflux  HP neg  Cardiac clearance: no contraindication  Echo: 2016 normal  Stress test: 2016 normal  PFTs: mild obstructive  EKG: nl  CXR: nl      Last tobacco: 7 year ago  Last NSAIDs: >1 month  Last ASA: n/a  Last steroids: n/a  Last hormones: n/a      Past Medical History:   Diagnosis Date   • Abnormal ECG     followed by cardiology annually, negative workup up per patient.    • Abnormal glucose     Was on metformin for DM, d/c soda and A1C normalized.    • Anxiety    • Boil     h/o mulitple boils, never had I&D, no known MRSA   • Depression    • Dyspepsia    • Fatigue    • Fatty liver     noted on US, confirmed during lap jessika (no Biopsy)   • Former smoker    • Hashimoto's disease     last labs showed  euthyroid without meds   • Heartburn     rare, doesn't take meds typically.  No h/o EGD or h pylori.    • Hypertension    • Morbid obesity with BMI of 40.0-44.9, adult (CMS/HCC)    • Pain in shin    • Shortness of breath    • Suspected sleep apnea     Has home oxygen testing, was on ngihttime oxygen in the past, has yet to have sleep study.    • Thyroid goiter     Followed by US annually, no procedures to date.      Past Surgical History:   Procedure Laterality Date   • LAPAROSCOPIC CHOLECYSTECTOMY  2014    cholelithiasis   • TUBAL ABDOMINAL LIGATION  2011    laparoscopic       No Known Allergies    Current Outpatient Medications:   •  Cholecalciferol (VITAMIN D3) 2000 units capsule, Take 5,000 Units by mouth Daily., Disp: , Rfl:   •  escitalopram (LEXAPRO) 10 MG tablet, Take 10 mg by mouth Daily., Disp: , Rfl:   •  lisinopril (PRINIVIL,ZESTRIL) 10 MG tablet, Take 10 mg by mouth Daily., Disp: , Rfl:   •  vitamin B-12 (CYANOCOBALAMIN) 2500 MCG sublingual tablet tablet, Place 500 mcg under the tongue 3 (Three) Times a Day., Disp: , Rfl:     Social History     Socioeconomic History   • Marital status: Single     Spouse name: Not on file   • Number of children: 3   • Years of education: High School    • Highest education level: Not on file   Social Needs   • Financial resource strain: Not on file   • Food insecurity - worry: Not on file   • Food insecurity - inability: Not on file   • Transportation needs - medical: Not on file   • Transportation needs - non-medical: Not on file   Occupational History   • Occupation: Student   Tobacco Use   • Smoking status: Former Smoker     Packs/day: 2.00     Types: Cigarettes   • Smokeless tobacco: Never Used   Substance and Sexual Activity   • Alcohol use: No   • Drug use: No   • Sexual activity: Not on file     Comment: no hormones   Other Topics Concern   • Not on file   Social History Narrative    Lives in [facility] with 3 children.  Not working, in school for associates in  Science and Art.      Family History   Problem Relation Age of Onset   • Hypertension Mother    • Hypertension Father    • Diabetes Maternal Grandmother    • Obesity Maternal Grandmother    • Heart attack Maternal Grandfather    • Breast cancer Paternal Grandmother        Review of Systems   Constitutional: Positive for fatigue and unexpected weight gain. Negative for chills, diaphoresis, fever and unexpected weight loss.   HENT: Negative for congestion, ear pain, facial swelling, rhinorrhea and sore throat.    Eyes: Negative for blurred vision, double vision and discharge.   Respiratory: Positive for shortness of breath. Negative for chest tightness, wheezing and stridor.    Cardiovascular: Negative for chest pain, palpitations and leg swelling.   Gastrointestinal: Negative for abdominal pain, blood in stool and vomiting.   Endocrine: Negative for polydipsia.   Genitourinary: Negative for hematuria.   Musculoskeletal: Positive for arthralgias. Negative for neck pain.   Skin: Negative for color change and rash.   Allergic/Immunologic: Negative for immunocompromised state.   Neurological: Negative for confusion.   Psychiatric/Behavioral: Negative for self-injury.       Physical Exam:  Vital Signs:  Weight: 108 kg (238 lb 8 oz)   Body mass index is 41.59 kg/m².  Temp: 98.3 °F (36.8 °C)   Heart Rate: 78   BP: 124/74     Physical Exam   Constitutional: She is oriented to person, place, and time. She appears well-developed and well-nourished.   HENT:   Head: Normocephalic and atraumatic.   Nose: Nose normal.   Eyes: Conjunctivae and EOM are normal. Pupils are equal, round, and reactive to light.   Neck: Normal range of motion. Neck supple. Carotid bruit is not present. No tracheal deviation present.   Thyroid slightly full on the left   Cardiovascular: Normal rate, regular rhythm and normal heart sounds.   Pulmonary/Chest: Effort normal and breath sounds normal. No respiratory distress.   Abdominal: Soft. She exhibits no  distension. There is no hepatosplenomegaly. There is no tenderness.   Scattered lap scars   Musculoskeletal: Normal range of motion. She exhibits no edema or deformity.   Neurological: She is alert and oriented to person, place, and time. No cranial nerve deficit. Coordination normal.   Skin: Skin is warm and dry. No rash noted.   Psychiatric: She has a normal mood and affect. Her behavior is normal. Judgment and thought content normal.   Vitals reviewed.      Patient Active Problem List   Diagnosis   • Hashimoto's disease   • Thyroid goiter   • Fatty liver   • Dyspepsia   • Fatigue   • Morbid obesity with BMI of 40.0-44.9, adult (CMS/HCC)   • Pain in shin   • Shortness of breath   • Heartburn   • Abnormal ECG   • Anxiety   • Depression   • Boil   • Former smoker   • Suspected sleep apnea   • Abnormal glucose       Assessment:    Dusty Calhoun is a 38 y.o. year old female with medically complicated obesity.    Weight loss surgery is deemed medically necessary given the following obesity related comorbidities including fatty liver, GERD, fatigue, depression, suspected sleep apnea  with current Weight: 108 kg (238 lb 8 oz) and Body mass index is 41.59 kg/m²..    Patient is aware that surgery is a tool, and that weight loss is not guaranteed but only seen in the context of appropriate use, follow up and exercise.    The patient was present for an approximately a 2.5 hour discussion of the purpose of weight loss surgery, how WLS is a tool to assist in achieving weight loss goals, the most common complications and how best to avoid them, and the strategies for short and long term weight loss.  Ample opportunity to discuss questions was available both in group and during the time of individual examination.    I reviewed all available preop labs, Xrays, tests, clearances, etc and signed off on these in the record.  All of this in addition to the patient's unique history and exam has been taken into consideration in  "determining their appropriate candidacy for weight loss surgery.    Complications  of laparoscopic/possible robotic gastric sleeve were discussed. The patient is well aware of the potential complications of surgery that include but not limited to bleeding, infections, deep venous thrombosis, pulmonary embolism, pulmonary complications such as pneumonia, cardiac events, hernias, small bowel obstruction, damage to the spleen or other organs, bowel injury, disfiguring scars, failure to lose weight, need for additional surgery, conversion to an open procedure, and death. Patient is also aware of complications which apply in this particular procedure that can include but are not limited to a \"leak\" at the staple line which in some instances may require conversion to gastric bypass.    The patient is aware if a hiatal hernia is encountered, it likely will be repaired.  R/B/A Rx to hiatal hernia repair were discussed as outlined in our long consent form.  Briefly risks in addition to those for LSG include recurrent hernia, RACHAEL, dysphagia, esophageal injury, pneumothorax, injury to the vagus nerves, injury to the thoracic duct, aorta or vena cava.    Greater than 3 minutes was spent with the patient discussing avoiding all tobacco products and second hand smoke at least 2 weeks pre-operatively and 6 weeks post-operatively to minimize the risk of sleeve leak.  This included discussing the importance of avoiding even secondhand smoke as the risk of leak is increased.  Examples discussed:  I made it very clear that the patient understands they should avoid even riding in a car where someone has previously smoked in the last 2 weeks, living in a house where someone smokes (even if it's in a separate room/patio/attached garage, etc.) we discussed that they should not have a conversation with a group of people who are smoking even if it's outside.  They can be around wood burning fires and barbecue.  I told them I do not know if " marijuana has a same effects but my overall recommendation is to avoid it for 2 weeks prior in 6 weeks after surgery.  They also are aware that nicotine may also increase the risk of leak and I strongly encouraged him to avoid that as well for 2 weeks prior in 6 weeks after surgery.    Discussed the risks, benefits and alternative therapies at great length as outlined in our extensive consent forms, consent videos, and educational teaching process under the direction of the center's .    A copy of the patient's signed informed consent is on file.    Plan:  Laparoscopic sleeve gastrectomy + HHR at  [facility].   Probably undiagnosed sleep apnea.      R/B/A Rx discussed to postop anticoagulation incl but not limited to bleeding, drug reaction, venothromboembolic events, etc. and she declined.  No personal or family hx of VTE, no hx MI.      MDM high:  Elective procedure with the following risk factors: morbid obesity  4+ chronic medical problems reviewed.        Joselin Fish MD

## 2019-02-27 PROBLEM — E66.01 OBESITY, CLASS III, BMI 40-49.9 (MORBID OBESITY) (HCC): Status: ACTIVE | Noted: 2019-02-27

## 2019-03-08 ENCOUNTER — ANESTHESIA EVENT (OUTPATIENT)
Dept: PERIOP | Facility: HOSPITAL | Age: 39
End: 2019-03-08

## 2019-03-08 RX ORDER — FAMOTIDINE 10 MG/ML
20 INJECTION, SOLUTION INTRAVENOUS ONCE
Status: CANCELLED | OUTPATIENT
Start: 2019-03-08 | End: 2019-03-08

## 2019-03-08 RX ORDER — FAMOTIDINE 20 MG/1
20 TABLET, FILM COATED ORAL ONCE
Status: CANCELLED | OUTPATIENT
Start: 2019-03-08 | End: 2019-03-08

## 2019-03-10 ENCOUNTER — APPOINTMENT (OUTPATIENT)
Dept: PREADMISSION TESTING | Facility: HOSPITAL | Age: 39
End: 2019-03-10

## 2019-03-10 LAB
DEPRECATED RDW RBC AUTO: 42 FL (ref 37–54)
ERYTHROCYTE [DISTWIDTH] IN BLOOD BY AUTOMATED COUNT: 13.1 % (ref 11.3–14.5)
HBA1C MFR BLD: 5.4 % (ref 4.8–5.6)
HCT VFR BLD AUTO: 40.4 % (ref 34.5–44)
HGB BLD-MCNC: 13.7 G/DL (ref 11.5–15.5)
MCH RBC QN AUTO: 29.7 PG (ref 27–31)
MCHC RBC AUTO-ENTMCNC: 33.9 G/DL (ref 32–36)
MCV RBC AUTO: 87.4 FL (ref 80–99)
PLATELET # BLD AUTO: 317 10*3/MM3 (ref 150–450)
PMV BLD AUTO: 9.1 FL (ref 6–12)
POTASSIUM BLD-SCNC: 4.4 MMOL/L (ref 3.5–5.5)
RBC # BLD AUTO: 4.62 10*6/MM3 (ref 3.89–5.14)
WBC NRBC COR # BLD: 9.19 10*3/MM3 (ref 3.5–10.8)

## 2019-03-10 PROCEDURE — 85027 COMPLETE CBC AUTOMATED: CPT | Performed by: ANESTHESIOLOGY

## 2019-03-10 PROCEDURE — 36415 COLL VENOUS BLD VENIPUNCTURE: CPT

## 2019-03-10 PROCEDURE — 83036 HEMOGLOBIN GLYCOSYLATED A1C: CPT | Performed by: ANESTHESIOLOGY

## 2019-03-10 PROCEDURE — 84132 ASSAY OF SERUM POTASSIUM: CPT | Performed by: ANESTHESIOLOGY

## 2019-03-10 RX ORDER — LEVOTHYROXINE SODIUM 0.05 MG/1
50 TABLET ORAL DAILY
COMMUNITY
Start: 2019-02-26

## 2019-03-10 NOTE — PAT
EKG/CXR 2/22/19    Cardiac clearance dated 6/22/18    Patient to apply Chlorhexadine wipes  to surgical area (as instructed) the night before procedure and the AM of procedure. Wipes provided.    Patient instructed by office to drink gatorade the am of surgery

## 2019-03-12 ENCOUNTER — HOSPITAL ENCOUNTER (INPATIENT)
Facility: HOSPITAL | Age: 39
LOS: 3 days | Discharge: HOME | End: 2019-03-15
Attending: SURGERY | Admitting: SURGERY

## 2019-03-12 ENCOUNTER — ANESTHESIA (OUTPATIENT)
Dept: PERIOP | Facility: HOSPITAL | Age: 39
End: 2019-03-12

## 2019-03-12 DIAGNOSIS — E66.01 OBESITY, CLASS III, BMI 40-49.9 (MORBID OBESITY) (HCC): ICD-10-CM

## 2019-03-12 LAB
B-HCG UR QL: NEGATIVE
INTERNAL NEGATIVE CONTROL: NEGATIVE
INTERNAL POSITIVE CONTROL: POSITIVE
Lab: NORMAL

## 2019-03-12 PROCEDURE — 25010000002 ONDANSETRON PER 1 MG: Performed by: NURSE ANESTHETIST, CERTIFIED REGISTERED

## 2019-03-12 PROCEDURE — 25010000003 CEFAZOLIN IN DEXTROSE 2-4 GM/100ML-% SOLUTION: Performed by: SURGERY

## 2019-03-12 PROCEDURE — 0BQT4ZZ REPAIR DIAPHRAGM, PERCUTANEOUS ENDOSCOPIC APPROACH: ICD-10-PCS | Performed by: SURGERY

## 2019-03-12 PROCEDURE — 25010000002 PROMETHAZINE PER 50 MG: Performed by: SURGERY

## 2019-03-12 PROCEDURE — 25010000002 ENOXAPARIN PER 10 MG: Performed by: SURGERY

## 2019-03-12 PROCEDURE — 25010000002 DEXAMETHASONE PER 1 MG: Performed by: NURSE ANESTHETIST, CERTIFIED REGISTERED

## 2019-03-12 PROCEDURE — 0DJ08ZZ INSPECTION OF UPPER INTESTINAL TRACT, VIA NATURAL OR ARTIFICIAL OPENING ENDOSCOPIC: ICD-10-PCS | Performed by: SURGERY

## 2019-03-12 PROCEDURE — 25010000002 HYDROMORPHONE PER 4 MG: Performed by: SURGERY

## 2019-03-12 PROCEDURE — 88307 TISSUE EXAM BY PATHOLOGIST: CPT | Performed by: SURGERY

## 2019-03-12 PROCEDURE — 43775 LAP SLEEVE GASTRECTOMY: CPT | Performed by: SURGERY

## 2019-03-12 PROCEDURE — 25010000002 PROPOFOL 10 MG/ML EMULSION: Performed by: NURSE ANESTHETIST, CERTIFIED REGISTERED

## 2019-03-12 PROCEDURE — 0DB64Z3 EXCISION OF STOMACH, PERCUTANEOUS ENDOSCOPIC APPROACH, VERTICAL: ICD-10-PCS | Performed by: SURGERY

## 2019-03-12 PROCEDURE — 25010000002 DEXAMETHASONE SODIUM PHOSPHATE 10 MG/ML SOLUTION: Performed by: ANESTHESIOLOGY

## 2019-03-12 PROCEDURE — 25010000002 BUPRENORPHINE PER 0.1 MG: Performed by: ANESTHESIOLOGY

## 2019-03-12 PROCEDURE — 81025 URINE PREGNANCY TEST: CPT | Performed by: ANESTHESIOLOGY

## 2019-03-12 PROCEDURE — 25010000002 ONDANSETRON PER 1 MG: Performed by: SURGERY

## 2019-03-12 DEVICE — REINFORCED INTELLIGENT RELOAD, FOR USE WITH SIGNIA STAPLING SYSTEM
Type: IMPLANTABLE DEVICE | Site: STOMACH | Status: FUNCTIONAL
Brand: TRI-STAPLE 2.0

## 2019-03-12 DEVICE — SEALANT WND FIBRIN TISSEEL PREFIL/SYR/PRIMAFZ 4ML: Type: IMPLANTABLE DEVICE | Status: FUNCTIONAL

## 2019-03-12 DEVICE — BLACK REINFORCED INTELLIGENT RELOAD, FOR USE WITH SIGNIA STAPLING SYSTEM
Type: IMPLANTABLE DEVICE | Site: STOMACH | Status: FUNCTIONAL
Brand: TRI-STAPLE 2.0

## 2019-03-12 RX ORDER — SODIUM CHLORIDE AND POTASSIUM CHLORIDE 150; 450 MG/100ML; MG/100ML
100 INJECTION, SOLUTION INTRAVENOUS CONTINUOUS
Status: DISCONTINUED | OUTPATIENT
Start: 2019-03-13 | End: 2019-03-15 | Stop reason: HOSPADM

## 2019-03-12 RX ORDER — DEXAMETHASONE SODIUM PHOSPHATE 10 MG/ML
INJECTION, SOLUTION INTRAMUSCULAR; INTRAVENOUS
Status: COMPLETED | OUTPATIENT
Start: 2019-03-12 | End: 2019-03-12

## 2019-03-12 RX ORDER — SODIUM CHLORIDE 0.9 % (FLUSH) 0.9 %
3 SYRINGE (ML) INJECTION EVERY 12 HOURS SCHEDULED
Status: DISCONTINUED | OUTPATIENT
Start: 2019-03-12 | End: 2019-03-12 | Stop reason: HOSPADM

## 2019-03-12 RX ORDER — CYANOCOBALAMIN 1000 UG/ML
1000 INJECTION, SOLUTION INTRAMUSCULAR; SUBCUTANEOUS ONCE
Status: COMPLETED | OUTPATIENT
Start: 2019-03-13 | End: 2019-03-13

## 2019-03-12 RX ORDER — LISINOPRIL 10 MG/1
10 TABLET ORAL DAILY
Status: DISCONTINUED | OUTPATIENT
Start: 2019-03-13 | End: 2019-03-15 | Stop reason: HOSPADM

## 2019-03-12 RX ORDER — ONDANSETRON 2 MG/ML
INJECTION INTRAMUSCULAR; INTRAVENOUS AS NEEDED
Status: DISCONTINUED | OUTPATIENT
Start: 2019-03-12 | End: 2019-03-12 | Stop reason: SURG

## 2019-03-12 RX ORDER — SODIUM CHLORIDE, SODIUM LACTATE, POTASSIUM CHLORIDE, CALCIUM CHLORIDE 600; 310; 30; 20 MG/100ML; MG/100ML; MG/100ML; MG/100ML
150 INJECTION, SOLUTION INTRAVENOUS CONTINUOUS
Status: ACTIVE | OUTPATIENT
Start: 2019-03-12 | End: 2019-03-13

## 2019-03-12 RX ORDER — PROMETHAZINE HYDROCHLORIDE 25 MG/ML
12.5 INJECTION, SOLUTION INTRAMUSCULAR; INTRAVENOUS EVERY 6 HOURS PRN
Status: DISCONTINUED | OUTPATIENT
Start: 2019-03-12 | End: 2019-03-15 | Stop reason: HOSPADM

## 2019-03-12 RX ORDER — HYDROCODONE BITARTRATE AND ACETAMINOPHEN 7.5; 325 MG/1; MG/1
1 TABLET ORAL EVERY 4 HOURS PRN
Status: DISCONTINUED | OUTPATIENT
Start: 2019-03-12 | End: 2019-03-15 | Stop reason: HOSPADM

## 2019-03-12 RX ORDER — BUPRENORPHINE HYDROCHLORIDE 0.32 MG/ML
INJECTION INTRAMUSCULAR; INTRAVENOUS
Status: COMPLETED | OUTPATIENT
Start: 2019-03-12 | End: 2019-03-12

## 2019-03-12 RX ORDER — DIPHENHYDRAMINE HYDROCHLORIDE 50 MG/ML
25 INJECTION INTRAMUSCULAR; INTRAVENOUS EVERY 4 HOURS PRN
Status: DISCONTINUED | OUTPATIENT
Start: 2019-03-12 | End: 2019-03-15 | Stop reason: HOSPADM

## 2019-03-12 RX ORDER — CEFAZOLIN SODIUM 2 G/100ML
2 INJECTION, SOLUTION INTRAVENOUS EVERY 8 HOURS
Status: COMPLETED | OUTPATIENT
Start: 2019-03-12 | End: 2019-03-13

## 2019-03-12 RX ORDER — METOCLOPRAMIDE HYDROCHLORIDE 5 MG/ML
10 INJECTION INTRAMUSCULAR; INTRAVENOUS EVERY 6 HOURS PRN
Status: DISCONTINUED | OUTPATIENT
Start: 2019-03-12 | End: 2019-03-15 | Stop reason: HOSPADM

## 2019-03-12 RX ORDER — NALOXONE HCL 0.4 MG/ML
0.1 VIAL (ML) INJECTION
Status: DISCONTINUED | OUTPATIENT
Start: 2019-03-12 | End: 2019-03-15 | Stop reason: HOSPADM

## 2019-03-12 RX ORDER — CEFAZOLIN SODIUM 2 G/100ML
2 INJECTION, SOLUTION INTRAVENOUS
Status: COMPLETED | OUTPATIENT
Start: 2019-03-12 | End: 2019-03-12

## 2019-03-12 RX ORDER — DEXAMETHASONE SODIUM PHOSPHATE 4 MG/ML
INJECTION, SOLUTION INTRA-ARTICULAR; INTRALESIONAL; INTRAMUSCULAR; INTRAVENOUS; SOFT TISSUE AS NEEDED
Status: DISCONTINUED | OUTPATIENT
Start: 2019-03-12 | End: 2019-03-12 | Stop reason: SURG

## 2019-03-12 RX ORDER — ONDANSETRON 4 MG/1
4 TABLET, FILM COATED ORAL EVERY 6 HOURS PRN
Status: DISCONTINUED | OUTPATIENT
Start: 2019-03-12 | End: 2019-03-15 | Stop reason: HOSPADM

## 2019-03-12 RX ORDER — GLYCOPYRROLATE 0.2 MG/ML
INJECTION INTRAMUSCULAR; INTRAVENOUS AS NEEDED
Status: DISCONTINUED | OUTPATIENT
Start: 2019-03-12 | End: 2019-03-12 | Stop reason: SURG

## 2019-03-12 RX ORDER — SODIUM CHLORIDE 0.9 % (FLUSH) 0.9 %
3 SYRINGE (ML) INJECTION EVERY 12 HOURS SCHEDULED
Status: DISCONTINUED | OUTPATIENT
Start: 2019-03-12 | End: 2019-03-15 | Stop reason: HOSPADM

## 2019-03-12 RX ORDER — PROCHLORPERAZINE MALEATE 5 MG/1
5 TABLET ORAL EVERY 6 HOURS PRN
Status: DISCONTINUED | OUTPATIENT
Start: 2019-03-12 | End: 2019-03-15 | Stop reason: HOSPADM

## 2019-03-12 RX ORDER — SODIUM CHLORIDE 9 MG/ML
150 INJECTION, SOLUTION INTRAVENOUS CONTINUOUS
Status: DISCONTINUED | OUTPATIENT
Start: 2019-03-12 | End: 2019-03-12 | Stop reason: HOSPADM

## 2019-03-12 RX ORDER — PROPOFOL 10 MG/ML
VIAL (ML) INTRAVENOUS AS NEEDED
Status: DISCONTINUED | OUTPATIENT
Start: 2019-03-12 | End: 2019-03-12 | Stop reason: SURG

## 2019-03-12 RX ORDER — PROPOFOL 10 MG/ML
VIAL (ML) INTRAVENOUS CONTINUOUS PRN
Status: DISCONTINUED | OUTPATIENT
Start: 2019-03-12 | End: 2019-03-12 | Stop reason: SURG

## 2019-03-12 RX ORDER — SODIUM CHLORIDE 0.9 % (FLUSH) 0.9 %
1-10 SYRINGE (ML) INJECTION AS NEEDED
Status: DISCONTINUED | OUTPATIENT
Start: 2019-03-12 | End: 2019-03-15 | Stop reason: HOSPADM

## 2019-03-12 RX ORDER — HYDROMORPHONE HYDROCHLORIDE 1 MG/ML
0.5 INJECTION, SOLUTION INTRAMUSCULAR; INTRAVENOUS; SUBCUTANEOUS
Status: DISCONTINUED | OUTPATIENT
Start: 2019-03-12 | End: 2019-03-13

## 2019-03-12 RX ORDER — HYDRALAZINE HYDROCHLORIDE 20 MG/ML
10 INJECTION INTRAMUSCULAR; INTRAVENOUS
Status: DISCONTINUED | OUTPATIENT
Start: 2019-03-12 | End: 2019-03-15 | Stop reason: HOSPADM

## 2019-03-12 RX ORDER — ONDANSETRON 2 MG/ML
4 INJECTION INTRAMUSCULAR; INTRAVENOUS EVERY 6 HOURS PRN
Status: DISCONTINUED | OUTPATIENT
Start: 2019-03-12 | End: 2019-03-15 | Stop reason: HOSPADM

## 2019-03-12 RX ORDER — FENTANYL CITRATE 50 UG/ML
50 INJECTION, SOLUTION INTRAMUSCULAR; INTRAVENOUS
Status: DISCONTINUED | OUTPATIENT
Start: 2019-03-12 | End: 2019-03-12 | Stop reason: HOSPADM

## 2019-03-12 RX ORDER — SIMETHICONE 80 MG
80 TABLET,CHEWABLE ORAL 4 TIMES DAILY PRN
Status: DISCONTINUED | OUTPATIENT
Start: 2019-03-12 | End: 2019-03-15 | Stop reason: HOSPADM

## 2019-03-12 RX ORDER — ESMOLOL HYDROCHLORIDE 10 MG/ML
INJECTION INTRAVENOUS AS NEEDED
Status: DISCONTINUED | OUTPATIENT
Start: 2019-03-12 | End: 2019-03-12 | Stop reason: SURG

## 2019-03-12 RX ORDER — SODIUM CHLORIDE, SODIUM LACTATE, POTASSIUM CHLORIDE, CALCIUM CHLORIDE 600; 310; 30; 20 MG/100ML; MG/100ML; MG/100ML; MG/100ML
9 INJECTION, SOLUTION INTRAVENOUS CONTINUOUS
Status: DISCONTINUED | OUTPATIENT
Start: 2019-03-12 | End: 2019-03-12

## 2019-03-12 RX ORDER — SODIUM CHLORIDE, SODIUM LACTATE, POTASSIUM CHLORIDE, CALCIUM CHLORIDE 600; 310; 30; 20 MG/100ML; MG/100ML; MG/100ML; MG/100ML
INJECTION, SOLUTION INTRAVENOUS CONTINUOUS PRN
Status: DISCONTINUED | OUTPATIENT
Start: 2019-03-12 | End: 2019-03-12 | Stop reason: SURG

## 2019-03-12 RX ORDER — CHLORHEXIDINE GLUCONATE 0.12 MG/ML
15 RINSE ORAL
Status: COMPLETED | OUTPATIENT
Start: 2019-03-12 | End: 2019-03-12

## 2019-03-12 RX ORDER — NEOSTIGMINE METHYLSULFATE 5 MG/5 ML
SYRINGE (ML) INTRAVENOUS AS NEEDED
Status: DISCONTINUED | OUTPATIENT
Start: 2019-03-12 | End: 2019-03-12 | Stop reason: SURG

## 2019-03-12 RX ORDER — SODIUM CHLORIDE 0.9 % (FLUSH) 0.9 %
3-10 SYRINGE (ML) INJECTION AS NEEDED
Status: DISCONTINUED | OUTPATIENT
Start: 2019-03-12 | End: 2019-03-12 | Stop reason: HOSPADM

## 2019-03-12 RX ORDER — ONDANSETRON 2 MG/ML
4 INJECTION INTRAMUSCULAR; INTRAVENOUS ONCE AS NEEDED
Status: COMPLETED | OUTPATIENT
Start: 2019-03-12 | End: 2019-03-12

## 2019-03-12 RX ORDER — PROMETHAZINE HYDROCHLORIDE 25 MG/1
12.5 TABLET ORAL EVERY 6 HOURS PRN
Status: DISCONTINUED | OUTPATIENT
Start: 2019-03-12 | End: 2019-03-15 | Stop reason: HOSPADM

## 2019-03-12 RX ORDER — PANTOPRAZOLE SODIUM 40 MG/10ML
40 INJECTION, POWDER, LYOPHILIZED, FOR SOLUTION INTRAVENOUS ONCE
Status: COMPLETED | OUTPATIENT
Start: 2019-03-12 | End: 2019-03-12

## 2019-03-12 RX ORDER — PANTOPRAZOLE SODIUM 40 MG/10ML
40 INJECTION, POWDER, LYOPHILIZED, FOR SOLUTION INTRAVENOUS
Status: DISCONTINUED | OUTPATIENT
Start: 2019-03-13 | End: 2019-03-13

## 2019-03-12 RX ORDER — PROCHLORPERAZINE 25 MG
25 SUPPOSITORY, RECTAL RECTAL EVERY 12 HOURS PRN
Status: DISCONTINUED | OUTPATIENT
Start: 2019-03-12 | End: 2019-03-15 | Stop reason: HOSPADM

## 2019-03-12 RX ORDER — MAGNESIUM HYDROXIDE 1200 MG/15ML
LIQUID ORAL AS NEEDED
Status: DISCONTINUED | OUTPATIENT
Start: 2019-03-12 | End: 2019-03-12 | Stop reason: HOSPADM

## 2019-03-12 RX ORDER — ROCURONIUM BROMIDE 10 MG/ML
INJECTION, SOLUTION INTRAVENOUS AS NEEDED
Status: DISCONTINUED | OUTPATIENT
Start: 2019-03-12 | End: 2019-03-12 | Stop reason: SURG

## 2019-03-12 RX ORDER — LEVOTHYROXINE SODIUM 0.05 MG/1
50 TABLET ORAL
Status: DISCONTINUED | OUTPATIENT
Start: 2019-03-13 | End: 2019-03-15 | Stop reason: HOSPADM

## 2019-03-12 RX ORDER — HYDROMORPHONE HYDROCHLORIDE 2 MG/1
2 TABLET ORAL
Status: DISCONTINUED | OUTPATIENT
Start: 2019-03-12 | End: 2019-03-15 | Stop reason: HOSPADM

## 2019-03-12 RX ORDER — SCOLOPAMINE TRANSDERMAL SYSTEM 1 MG/1
1 PATCH, EXTENDED RELEASE TRANSDERMAL ONCE
Status: DISCONTINUED | OUTPATIENT
Start: 2019-03-12 | End: 2019-03-12

## 2019-03-12 RX ORDER — LIDOCAINE HYDROCHLORIDE 10 MG/ML
0.5 INJECTION, SOLUTION EPIDURAL; INFILTRATION; INTRACAUDAL; PERINEURAL ONCE AS NEEDED
Status: COMPLETED | OUTPATIENT
Start: 2019-03-12 | End: 2019-03-12

## 2019-03-12 RX ORDER — BUPIVACAINE HYDROCHLORIDE 2.5 MG/ML
INJECTION, SOLUTION EPIDURAL; INFILTRATION; INTRACAUDAL
Status: COMPLETED | OUTPATIENT
Start: 2019-03-12 | End: 2019-03-12

## 2019-03-12 RX ORDER — LIDOCAINE HYDROCHLORIDE 10 MG/ML
INJECTION, SOLUTION INFILTRATION; PERINEURAL AS NEEDED
Status: DISCONTINUED | OUTPATIENT
Start: 2019-03-12 | End: 2019-03-12 | Stop reason: SURG

## 2019-03-12 RX ADMIN — SODIUM CHLORIDE, POTASSIUM CHLORIDE, SODIUM LACTATE AND CALCIUM CHLORIDE 150 ML/HR: 600; 310; 30; 20 INJECTION, SOLUTION INTRAVENOUS at 18:25

## 2019-03-12 RX ADMIN — BUPRENORPHINE HYDROCHLORIDE 0.3 MG: 0.32 INJECTION INTRAMUSCULAR; INTRAVENOUS at 14:40

## 2019-03-12 RX ADMIN — Medication 3 MG: at 16:05

## 2019-03-12 RX ADMIN — SCOPALAMINE 1 PATCH: 1 PATCH, EXTENDED RELEASE TRANSDERMAL at 09:00

## 2019-03-12 RX ADMIN — DEXAMETHASONE SODIUM PHOSPHATE 8 MG: 4 INJECTION, SOLUTION INTRAMUSCULAR; INTRAVENOUS at 14:45

## 2019-03-12 RX ADMIN — ESMOLOL HYDROCHLORIDE 30 MG: 10 INJECTION INTRAVENOUS at 14:38

## 2019-03-12 RX ADMIN — CEFAZOLIN SODIUM 2 G: 2 INJECTION, SOLUTION INTRAVENOUS at 20:30

## 2019-03-12 RX ADMIN — ONDANSETRON 4 MG: 2 INJECTION INTRAMUSCULAR; INTRAVENOUS at 20:33

## 2019-03-12 RX ADMIN — DEXAMETHASONE SODIUM PHOSPHATE 4 MG: 10 INJECTION INTRAMUSCULAR; INTRAVENOUS at 14:40

## 2019-03-12 RX ADMIN — PROMETHAZINE HYDROCHLORIDE 12.5 MG: 25 INJECTION INTRAMUSCULAR; INTRAVENOUS at 23:21

## 2019-03-12 RX ADMIN — ONDANSETRON 4 MG: 2 INJECTION INTRAMUSCULAR; INTRAVENOUS at 16:05

## 2019-03-12 RX ADMIN — GLYCOPYRROLATE 0.4 MG: 0.2 INJECTION, SOLUTION INTRAMUSCULAR; INTRAVENOUS at 16:05

## 2019-03-12 RX ADMIN — BUPIVACAINE HYDROCHLORIDE 60 ML: 2.5 INJECTION, SOLUTION EPIDURAL; INFILTRATION; INTRACAUDAL; PERINEURAL at 14:40

## 2019-03-12 RX ADMIN — SODIUM CHLORIDE 150 ML/HR: 9 INJECTION, SOLUTION INTRAVENOUS at 09:24

## 2019-03-12 RX ADMIN — HYDROMORPHONE HYDROCHLORIDE 0.5 MG: 1 INJECTION, SOLUTION INTRAMUSCULAR; INTRAVENOUS; SUBCUTANEOUS at 23:21

## 2019-03-12 RX ADMIN — SODIUM CHLORIDE, POTASSIUM CHLORIDE, SODIUM LACTATE AND CALCIUM CHLORIDE: 600; 310; 30; 20 INJECTION, SOLUTION INTRAVENOUS at 14:33

## 2019-03-12 RX ADMIN — SODIUM CHLORIDE, POTASSIUM CHLORIDE, SODIUM LACTATE AND CALCIUM CHLORIDE: 600; 310; 30; 20 INJECTION, SOLUTION INTRAVENOUS at 14:55

## 2019-03-12 RX ADMIN — ROCURONIUM BROMIDE 50 MG: 10 INJECTION INTRAVENOUS at 14:38

## 2019-03-12 RX ADMIN — LIDOCAINE HYDROCHLORIDE 50 MG: 10 INJECTION, SOLUTION INFILTRATION; PERINEURAL at 14:38

## 2019-03-12 RX ADMIN — CHLORHEXIDINE GLUCONATE 15 ML: 1.2 RINSE ORAL at 09:00

## 2019-03-12 RX ADMIN — HYDROMORPHONE HYDROCHLORIDE 0.5 MG: 1 INJECTION, SOLUTION INTRAMUSCULAR; INTRAVENOUS; SUBCUTANEOUS at 20:31

## 2019-03-12 RX ADMIN — SODIUM CHLORIDE 1000 ML: 9 INJECTION, SOLUTION INTRAVENOUS at 08:58

## 2019-03-12 RX ADMIN — CHLORHEXIDINE GLUCONATE 15 ML: 1.2 RINSE ORAL at 08:55

## 2019-03-12 RX ADMIN — PROPOFOL 25 MCG/KG/MIN: 10 INJECTION, EMULSION INTRAVENOUS at 14:45

## 2019-03-12 RX ADMIN — PROPOFOL 200 MG: 10 INJECTION, EMULSION INTRAVENOUS at 14:38

## 2019-03-12 RX ADMIN — ONDANSETRON 4 MG: 2 INJECTION INTRAMUSCULAR; INTRAVENOUS at 16:35

## 2019-03-12 RX ADMIN — SODIUM CHLORIDE, PRESERVATIVE FREE 10 ML: 5 INJECTION INTRAVENOUS at 09:01

## 2019-03-12 RX ADMIN — LIDOCAINE HYDROCHLORIDE 0.5 ML: 10 INJECTION, SOLUTION EPIDURAL; INFILTRATION; INTRACAUDAL; PERINEURAL at 08:58

## 2019-03-12 RX ADMIN — CEFAZOLIN SODIUM 2 G: 2 INJECTION, SOLUTION INTRAVENOUS at 14:37

## 2019-03-12 RX ADMIN — PANTOPRAZOLE SODIUM 40 MG: 40 INJECTION, POWDER, FOR SOLUTION INTRAVENOUS at 09:01

## 2019-03-12 NOTE — OP NOTE
OPERATIVE REPORT    DATE: 03/12/19    PATIENT: Dusty Calhoun    PREOPERATIVE DIAGNOSIS:    1. Obesity with multiple comorbidities  2.  Hiatal hernia     POSTOPERATIVE DIAGNOSIS:    1. Obesity with multiple comorbidities  2.  Hiatal hernia     PROCEDURES PERFORMED:  1. Laparoscopic sleeve gastrectomy (85% subtotal vertical gastrectomy) over a  36-Chinese bougie dilator.   2.  Esophagogastroduodenoscopy  3.  Laparoscopic hiatal hernia repair     SURGEON:  Joselin Fish M.D.    ANESTHESIA:  General endotracheal with KRISTEN block    ESTIMATED BLOOD LOSS:   <25 mL    FLUIDS:  Crystalloids.    SPECIMENS:  Subtotal gastrectomy.    DRAINS:  None.    COUNTS:  Correct.    COMPLICATIONS:  None.    FINDINGS: small hiatal hernia, umbilical hernia    INDICATIONS:   Dusty Calhoun is a 38 y.o. year old female with morbid obesity and associated comorbidities who presents for elective laparoscopic sleeve gastrectomy. The patient has has undergone our extensive preoperative education, teaching, and consent process. Everything is in order and they wish to proceed.  Additionally, preoperative endoscopy indicated a hiatal hernia and this will be repaired today also.      DESCRIPTION OF PROCEDURE:   The patient was brought to the operating room and placed supine upon the operating room table. SCDs were placed. The patient underwent uneventful general endotracheal anesthesia and bilateral KRISTEN blocks per the anesthesiology staff.  She received subcutaneous Lovenox and was given 2 grams of Ancef. The abdomen was prepped with ChloraPrep and draped in the usual sterile fashion. An Ioban was used as well.  Timeout was performed.       A small transverse incision was made a few centimeters above and to the left of the umbilicus and the peritoneal cavity entered under direct camera visualization using an 11 mm 0° laparoscope and an Optiview trocar. The abdomen was then insufflated to a pressure of 16 mmHg with CO2 gas. Exploratory  laparoscopy revealed no evidence of injury from the entrance technique. The liver had a uniform capsule and was moderate in size without evidence of gross hepatomegaly or fibrosis.  The gallbladder was normal.  A 5 mm port was placed in the right mid abdomen laterally and a 15 mm port was placed just right of the umbilicus.  I noted an 2-3 cm umbilical hernia without contents.  An 11 mm port was placed lateral and to the left to the first port and a 5 mm port was placed a handsbreadth lateral to that on the left.  A Carola liver retractor was placed through a small subxiphoid stab incision and the the left lobe of the liver was elevated.       The stomach was decompressed with an orogastric tube, which was then withdrawn back into the esophagus. The greater curvature vessels were taken down with a Ligasure energy device beginning at the midpoint of the stomach and continued up to the angle of His, including the short gastrics. The left louis was completely exposed and there was no hiatal hernia here, but a small hernia seen anteriorly This was photodocumented. The GE junction fat pad was elevated to make a clear landing zone for the stapler later. The greater curvature vessels were taken down with the Ligasure extending distally within 3 cm of the pylorus. Filmy adhesions to the stomach were taken down posteriorly.    I incised the hernia sac from the left louis of the diaphragm.  I incised the phrenoesophageal ligament with the Ligasure. The pars flaccida was opened (there was a replaced hepatic vessel) and the right louis was exposed.  I incised the hernia sac from the right louis.  An instrument was passed under the esophagus at the base of the hiatus and brought easily out the other side.  A penrose drain was placed around the esophagus for retraction.  The esophagus was mobilized into the abdomen 5 cm.  A posterior cruroplasty was performed with two 0 silk stitches.  The crura came together without tension and  repair was photodocumented.  The penrose was removed.  The time to repair the hernia was documented and was 16 minutes.      The OGT was removed, and the 36 Australian bougie dilator was passed transorally and positioned along the lesser curvature of the stomach with the tip at the pylorus. Using the bougie as template, a sleeve gastrectomy was performed with an articulating Signia stapler bolstered by double tissue reinforcement. The first load was a 60mm black, the next load at the incisura was a 45 mm purple, and the next three loads were purple 60 mm, and the last load was a 45 mm purple.  The bougie was removed before the last staple load was fired. Care was taken to stay 1 cm away from the esophagus to prevent any esophagus fibers from being divided. The staple line was examined and was hemostatic.  The gastrectomy specimen was removed through the 15 mm port site in a specimen bag. The specimen was later examined, and the staples were well-formed. Palpation of the specimen revealed no masses. The staple line of the sleeve gastrectomy revealed staples that were well-formed.      The flexible endoscope was passed transorally down to the pylorus easily. The sleeve extended to within 2-3 cm proximal to the pylorus and was hemostatic. The sleeve was not narrow or twisted. There was no persistent hiatal hernia or distal esophagitis. The rest of the esophagus was normal. The scope was removed. The leak test was normal.    I rescrubbed and suctioned the irrigation fluid from the upper abdomen, making sure it was dry. The staple line on the stomach was hemostatic.  A 2-0 Vicryl stitch was used to pexy the proximal staple line to the left louis of the diaphragm, and another 2-0 Vicryl stitch was used to pexy the mid body of the stomach to the cut edge of the omentum.  The staple line was treated with 4 ml of aerosolized Tisseel fibrin glue.  The liver retractor was removed easily.      The 15 mm port was removed under direct  visualization and there was no bleeding. This incision was closed with an 0-vicryl transfascial suture using a suture passer under direct visualization in a horizontal mattress fashion. All other ports were removed and then replaced under direct visualization and all of these were hemostatic. The instruments were removed and the abdomen was desufflated. The ports were removed. A 2-0 vicryl was used to close the subcutaneous tissue in the 15 mm port site. 3-0 monocryl plus was used to close skin incisions with interrupted sutures. Skin Affix skin glue was placed. The patient was awakened and taken to recovery in good condition, having tolerated the procedure well. All sponge, needle, and instrument counts were correct.

## 2019-03-12 NOTE — BRIEF OP NOTE
GASTRIC SLEEVE LAPAROSCOPIC, ESOPHAGOGASTRODUODENOSCOPY, HIATAL HERNIA REPAIR LAPAROSCOPIC  Progress Note    Dusty Calhoun  3/12/2019    Pre-op Diagnosis:   Obesity, Class III, BMI 40-49.9 (morbid obesity) (CMS/[facility]) [E66.01]       Post-Op Diagnosis Codes:     * Obesity, Class III, BMI 40-49.9 (morbid obesity) (CMS/[facility]) [E66.01]    Procedure/CPT® Codes:  MO LAP, VERNON RESTRICT PROC, LONGITUDINAL GASTRECTOMY [65700]  MO ESOPHAGOGASTRODUODENOSCOPY TRANSORAL DIAGNOSTIC [59719]  MO LAP,ESOPHAGOGAST FUNDOPLASTY [65073]    Procedure(s):  GASTRIC SLEEVE LAPAROSCOPIC  ESOPHAGOGASTRODUODENOSCOPY  HIATAL HERNIA REPAIR LAPAROSCOPIC    Surgeon(s):  Joselin Fish MD    Anesthesia: General with Block    Staff:   Circulator: Chanelle Arciniega RN  Scrub Person: Lory Ferrera; Vita Lee  Nursing Assistant: Krista Martinez; Danelle Waters; Gina Carolina    Estimated Blood Loss: none    Urine Voided: * No values recorded between 3/12/2019  2:35 PM and 3/12/2019  4:15 PM *    Specimens:                ID Type Source Tests Collected by Time   A : SUB-TOTAL GASTRECTOMY Tissue Stomach TISSUE PATHOLOGY EXAM Joselin Fish MD 3/12/2019 1500         Drains:      Findings: small hiatal hernia, umbilical hernia    Complications: none      Joselin Fish MD     Date: 3/12/2019  Time: 4:15 PM

## 2019-03-12 NOTE — ANESTHESIA PROCEDURE NOTES
Subcostal TAPs      Patient location during procedure: OR  Reason for block: at surgeon's request and post-op pain management  Performed by  Anesthesiologist: Minor Orellana MD  CRNA: Blanca Vance SRNA  Preanesthetic Checklist  Completed: patient identified, site marked, surgical consent, pre-op evaluation, timeout performed, IV checked, risks and benefits discussed and monitors and equipment checked  Prep:  Pt Position: supine  Sterile barriers:cap, gloves, sterile barriers and mask  Prep: ChloraPrep  Patient monitoring: blood pressure monitoring, continuous pulse oximetry and EKG  Procedure  Sedation:yes  Performed under: general  Guidance:ultrasound guided  Images:still images obtained    Laterality:Bilateral  Block Type:TAP  Injection Technique:single-shot  Needle Type:short-bevel and echogenic  Needle Gauge:20 G    Medications Used: buprenorphine (BUPRENEX) injection, 0.3 mg  dexamethasone sodium phosphate injection, 4 mg  bupivacaine PF (MARCAINE) 0.25 % injection, 60 mL  Med admintered at 3/12/2019 2:40 PM  Medications  Comment:Block Injection:  LA dose divided between Right and Left block       Adjuncts:  Decadron 4mg PSF, Buprenex 0.3mg (Per total volume of LA)    Post Assessment  Injection Assessment: negative aspiration for heme, incremental injection and no paresthesia on injection  Patient Tolerance:comfortable throughout block  Complications:no  Additional Notes      Under Ultrasound guidance, a BBraun 4inch 360 degree needle was advanced with Normal Saline hydro dissection of tissue.  The Internal Oblique and Transversus Abdominus muscles where visualized.  At or before the aponeurosis of Internal Oblique, local anesthetic spread was visualized in the Transversus Abdominus Plane. Injection was made incrementally with aspiration every 5 mls.  There was no  intravascular injection,  injection pressure was normal, there was no neural injection, and the procedure was completed without difficulty.   Thank You.

## 2019-03-12 NOTE — ANESTHESIA PROCEDURE NOTES
Airway  Urgency: elective    Airway not difficult    General Information and Staff    Patient location during procedure: OR  CRNA: Olive Garcia CRNA    Indications and Patient Condition  Indications for airway management: airway protection    Preoxygenated: yes  MILS not maintained throughout  Mask difficulty assessment: 1 - vent by mask    Final Airway Details  Final airway type: endotracheal airway      Successful airway: ETT  Cuffed: yes   Successful intubation technique: direct laryngoscopy  Facilitating devices/methods: intubating stylet  Endotracheal tube insertion site: oral  Blade: Bella  Blade size: 2  ETT size (mm): 7.0  Cormack-Lehane Classification: grade I - full view of glottis  Placement verified by: chest auscultation and capnometry   Measured from: lips  ETT to lips (cm): 20  Number of attempts at approach: 1    Additional Comments  Negative epigastric sounds, Breath sound equal bilaterally with symmetric chest rise and fall.  Teeth intact, atraumatic

## 2019-03-12 NOTE — ANESTHESIA PREPROCEDURE EVALUATION
Anesthesia Evaluation     Patient summary reviewed and Nursing notes reviewed   history of anesthetic complications: PONV  NPO Solid Status: > 8 hours  NPO Liquid Status: > 2 hours           Airway   Mallampati: I  TM distance: >3 FB  Neck ROM: full  No difficulty expected  Dental - normal exam     Pulmonary    (+) shortness of breath, sleep apnea, decreased breath sounds,   Cardiovascular   Exercise tolerance: good (4-7 METS)    Rhythm: regular  Rate: normal    (+) hypertension well controlled less than 2 medications,       Neuro/Psych  (+) psychiatric history Anxiety,     GI/Hepatic/Renal/Endo    (+) morbid obesity,  liver disease fatty liver disease, hypothyroidism,   (-)  obesity    ROS Comment: MCCLENDON    Musculoskeletal     Abdominal   (+) obese,     Abdomen: soft.   Substance History      OB/GYN          Other   (+) arthritis                   Anesthesia Plan    ASA 3     general with block     intravenous induction   Anesthetic plan, all risks, benefits, and alternatives have been provided, discussed and informed consent has been obtained with: patient.    Plan discussed with CRNA.

## 2019-03-12 NOTE — ANESTHESIA POSTPROCEDURE EVALUATION
Patient: Dusty Calhoun    Procedure Summary     Date:  03/12/19 Room / Location:   [facility]    Anesthesia Start:  1435 Anesthesia Stop:  1621    Procedures:       GASTRIC SLEEVE LAPAROSCOPIC (N/A Abdomen)      ESOPHAGOGASTRODUODENOSCOPY (N/A Esophagus)      HIATAL HERNIA REPAIR LAPAROSCOPIC (N/A Abdomen) Diagnosis:       Obesity, Class III, BMI 40-49.9 (morbid obesity) (CMS/HCC)      (Obesity, Class III, BMI 40-49.9 (morbid obesity) (CMS/[facility]) [E66.01])    Surgeon:  Joselin Fish MD Provider:  Minor Orellana MD    Anesthesia Type:  general with block ASA Status:  3          Anesthesia Type: general with block  Last vitals  BP   106/50 (03/12/19 1618)   Temp   98 °F (36.7 °C) (03/12/19 1618)   Pulse   102 (03/12/19 1618)   Resp   16 (03/12/19 1618)     SpO2   96 % (03/12/19 1618)     Post Anesthesia Care and Evaluation    Patient location during evaluation: PACU  Patient participation: complete - patient participated  Level of consciousness: awake and alert  Pain score: 0  Pain management: adequate  Airway patency: patent  Anesthetic complications: No anesthetic complications  PONV Status: none  Cardiovascular status: hemodynamically stable and acceptable  Respiratory status: nonlabored ventilation, acceptable and nasal cannula  Hydration status: acceptable

## 2019-03-12 NOTE — PLAN OF CARE
Problem: Patient Care Overview  Goal: Plan of Care Review  Outcome: Ongoing (interventions implemented as appropriate)   03/12/19 1745   Coping/Psychosocial   Plan of Care Reviewed With patient;friend   Plan of Care Review   Progress no change       Problem: Fall Risk (Adult)  Goal: Identify Related Risk Factors and Signs and Symptoms  Outcome: Ongoing (interventions implemented as appropriate)   03/12/19 1745   Fall Risk (Adult)   Related Risk Factors (Fall Risk) confusion/agitation;sensory deficits;environment unfamiliar   Signs and Symptoms (Fall Risk) presence of risk factors       Problem: Bariatric Surgery (Adult,Pediatric)  Goal: Signs and Symptoms of Listed Potential Problems Will be Absent, Minimized or Managed (Bariatric Surgery)  Outcome: Ongoing (interventions implemented as appropriate)   03/12/19 1745   Goal/Outcome Evaluation   Problems Assessed (Bariatric Surgery) all   Problems Present (Bariatric Surgery) pain;postoperative nausea and vomiting

## 2019-03-13 ENCOUNTER — APPOINTMENT (OUTPATIENT)
Dept: GENERAL RADIOLOGY | Facility: HOSPITAL | Age: 39
End: 2019-03-13

## 2019-03-13 LAB
ALBUMIN SERPL-MCNC: 4.34 G/DL (ref 3.2–4.8)
ALBUMIN/GLOB SERPL: 2.3 G/DL (ref 1.5–2.5)
ALP SERPL-CCNC: 60 U/L (ref 25–100)
ALT SERPL W P-5'-P-CCNC: 64 U/L (ref 7–40)
ANION GAP SERPL CALCULATED.3IONS-SCNC: 10 MMOL/L (ref 3–11)
AST SERPL-CCNC: 36 U/L (ref 0–33)
BASOPHILS # BLD AUTO: 0 10*3/MM3 (ref 0–0.2)
BASOPHILS NFR BLD AUTO: 0 % (ref 0–1)
BILIRUB SERPL-MCNC: 0.4 MG/DL (ref 0.3–1.2)
BUN BLD-MCNC: 10 MG/DL (ref 9–23)
BUN/CREAT SERPL: 10.5 (ref 7–25)
CALCIUM SPEC-SCNC: 8.5 MG/DL (ref 8.7–10.4)
CHLORIDE SERPL-SCNC: 106 MMOL/L (ref 99–109)
CO2 SERPL-SCNC: 22 MMOL/L (ref 20–31)
CREAT BLD-MCNC: 0.95 MG/DL (ref 0.6–1.3)
DEPRECATED RDW RBC AUTO: 40.9 FL (ref 37–54)
EOSINOPHIL # BLD AUTO: 0 10*3/MM3 (ref 0–0.3)
EOSINOPHIL NFR BLD AUTO: 0 % (ref 0–3)
ERYTHROCYTE [DISTWIDTH] IN BLOOD BY AUTOMATED COUNT: 12.9 % (ref 11.3–14.5)
GFR SERPL CREATININE-BSD FRML MDRD: 67 ML/MIN/1.73
GLOBULIN UR ELPH-MCNC: 1.9 GM/DL
GLUCOSE BLD-MCNC: 139 MG/DL (ref 70–100)
HCT VFR BLD AUTO: 38.1 % (ref 34.5–44)
HGB BLD-MCNC: 13.3 G/DL (ref 11.5–15.5)
IMM GRANULOCYTES # BLD AUTO: 0.04 10*3/MM3 (ref 0–0.05)
IMM GRANULOCYTES NFR BLD AUTO: 0.3 % (ref 0–0.6)
IRON 24H UR-MRATE: 29 MCG/DL (ref 50–175)
LYMPHOCYTES # BLD AUTO: 1.29 10*3/MM3 (ref 0.6–4.8)
LYMPHOCYTES NFR BLD AUTO: 10.7 % (ref 24–44)
MCH RBC QN AUTO: 30 PG (ref 27–31)
MCHC RBC AUTO-ENTMCNC: 34.9 G/DL (ref 32–36)
MCV RBC AUTO: 86 FL (ref 80–99)
MONOCYTES # BLD AUTO: 0.87 10*3/MM3 (ref 0–1)
MONOCYTES NFR BLD AUTO: 7.2 % (ref 0–12)
NEUTROPHILS # BLD AUTO: 9.89 10*3/MM3 (ref 1.5–8.3)
NEUTROPHILS NFR BLD AUTO: 81.8 % (ref 41–71)
PLATELET # BLD AUTO: 342 10*3/MM3 (ref 150–450)
PMV BLD AUTO: 8.7 FL (ref 6–12)
POTASSIUM BLD-SCNC: 4.4 MMOL/L (ref 3.5–5.5)
PROT SERPL-MCNC: 6.2 G/DL (ref 5.7–8.2)
RBC # BLD AUTO: 4.43 10*6/MM3 (ref 3.89–5.14)
SODIUM BLD-SCNC: 138 MMOL/L (ref 132–146)
WBC NRBC COR # BLD: 12.09 10*3/MM3 (ref 3.5–10.8)

## 2019-03-13 PROCEDURE — 74241: CPT

## 2019-03-13 PROCEDURE — 99024 POSTOP FOLLOW-UP VISIT: CPT | Performed by: SURGERY

## 2019-03-13 PROCEDURE — 25010000002 ONDANSETRON PER 1 MG: Performed by: SURGERY

## 2019-03-13 PROCEDURE — 25010000002 PROMETHAZINE PER 50 MG: Performed by: SURGERY

## 2019-03-13 PROCEDURE — 25010000003 CEFAZOLIN IN DEXTROSE 2-4 GM/100ML-% SOLUTION: Performed by: SURGERY

## 2019-03-13 PROCEDURE — 0 DIATRIZOATE MEGLUMINE & SODIUM PER 1 ML: Performed by: SURGERY

## 2019-03-13 PROCEDURE — 25010000002 HYDROMORPHONE PER 4 MG: Performed by: SURGERY

## 2019-03-13 PROCEDURE — 25010000002 NA FERRIC GLUC CPLX PER 12.5 MG: Performed by: SURGERY

## 2019-03-13 PROCEDURE — 83540 ASSAY OF IRON: CPT | Performed by: SURGERY

## 2019-03-13 PROCEDURE — 63710000001 PROMETHAZINE PER 25 MG: Performed by: SURGERY

## 2019-03-13 PROCEDURE — 25010000002 THIAMINE PER 100 MG: Performed by: SURGERY

## 2019-03-13 PROCEDURE — 25010000002 HYDRALAZINE PER 20 MG: Performed by: SURGERY

## 2019-03-13 PROCEDURE — 25010000002 CYANOCOBALAMIN PER 1000 MCG: Performed by: SURGERY

## 2019-03-13 PROCEDURE — 85025 COMPLETE CBC W/AUTO DIFF WBC: CPT | Performed by: SURGERY

## 2019-03-13 PROCEDURE — 25010000002 ENOXAPARIN PER 10 MG: Performed by: SURGERY

## 2019-03-13 PROCEDURE — 25010000002 PROCHLORPERAZINE EDISYLATE PER 10 MG: Performed by: SURGERY

## 2019-03-13 PROCEDURE — 80053 COMPREHEN METABOLIC PANEL: CPT | Performed by: SURGERY

## 2019-03-13 RX ORDER — PANTOPRAZOLE SODIUM 40 MG/1
40 TABLET, DELAYED RELEASE ORAL
Status: DISCONTINUED | OUTPATIENT
Start: 2019-03-14 | End: 2019-03-15 | Stop reason: HOSPADM

## 2019-03-13 RX ADMIN — HYDROMORPHONE HYDROCHLORIDE 0.5 MG: 1 INJECTION, SOLUTION INTRAMUSCULAR; INTRAVENOUS; SUBCUTANEOUS at 05:52

## 2019-03-13 RX ADMIN — SIMETHICONE CHEW TAB 80 MG 80 MG: 80 TABLET ORAL at 05:52

## 2019-03-13 RX ADMIN — LISINOPRIL 10 MG: 10 TABLET ORAL at 09:53

## 2019-03-13 RX ADMIN — ONDANSETRON 4 MG: 2 INJECTION INTRAMUSCULAR; INTRAVENOUS at 03:13

## 2019-03-13 RX ADMIN — HYDROCODONE BITARTRATE AND ACETAMINOPHEN 1 TABLET: 7.5; 325 TABLET ORAL at 17:23

## 2019-03-13 RX ADMIN — PROMETHAZINE HYDROCHLORIDE 12.5 MG: 25 TABLET ORAL at 21:40

## 2019-03-13 RX ADMIN — SODIUM CHLORIDE, PRESERVATIVE FREE 3 ML: 5 INJECTION INTRAVENOUS at 03:04

## 2019-03-13 RX ADMIN — CEFAZOLIN SODIUM 2 G: 2 INJECTION, SOLUTION INTRAVENOUS at 04:11

## 2019-03-13 RX ADMIN — ENOXAPARIN SODIUM 40 MG: 40 INJECTION SUBCUTANEOUS at 09:54

## 2019-03-13 RX ADMIN — ONDANSETRON HYDROCHLORIDE 4 MG: 4 TABLET, FILM COATED ORAL at 18:01

## 2019-03-13 RX ADMIN — POTASSIUM CHLORIDE AND SODIUM CHLORIDE 100 ML/HR: 450; 150 INJECTION, SOLUTION INTRAVENOUS at 15:37

## 2019-03-13 RX ADMIN — HYDROMORPHONE HYDROCHLORIDE 2 MG: 2 TABLET ORAL at 21:40

## 2019-03-13 RX ADMIN — HYDROMORPHONE HYDROCHLORIDE 0.5 MG: 1 INJECTION, SOLUTION INTRAMUSCULAR; INTRAVENOUS; SUBCUTANEOUS at 09:52

## 2019-03-13 RX ADMIN — Medication 30 ML: at 09:12

## 2019-03-13 RX ADMIN — PANTOPRAZOLE SODIUM 40 MG: 40 INJECTION, POWDER, FOR SOLUTION INTRAVENOUS at 05:43

## 2019-03-13 RX ADMIN — SODIUM CHLORIDE, PRESERVATIVE FREE 3 ML: 5 INJECTION INTRAVENOUS at 09:55

## 2019-03-13 RX ADMIN — THIAMINE HYDROCHLORIDE 100 ML/HR: 100 INJECTION, SOLUTION INTRAMUSCULAR; INTRAVENOUS at 04:13

## 2019-03-13 RX ADMIN — SIMETHICONE CHEW TAB 80 MG 80 MG: 80 TABLET ORAL at 11:16

## 2019-03-13 RX ADMIN — HYDROMORPHONE HYDROCHLORIDE 0.5 MG: 1 INJECTION, SOLUTION INTRAMUSCULAR; INTRAVENOUS; SUBCUTANEOUS at 03:10

## 2019-03-13 RX ADMIN — LEVOTHYROXINE SODIUM 50 MCG: 50 TABLET ORAL at 05:43

## 2019-03-13 RX ADMIN — SODIUM CHLORIDE 125 MG: 9 INJECTION, SOLUTION INTRAVENOUS at 14:31

## 2019-03-13 RX ADMIN — HYDRALAZINE HYDROCHLORIDE 10 MG: 20 INJECTION INTRAMUSCULAR; INTRAVENOUS at 03:10

## 2019-03-13 RX ADMIN — ONDANSETRON 4 MG: 2 INJECTION INTRAMUSCULAR; INTRAVENOUS at 09:57

## 2019-03-13 RX ADMIN — HYDROMORPHONE HYDROCHLORIDE 0.5 MG: 1 INJECTION, SOLUTION INTRAMUSCULAR; INTRAVENOUS; SUBCUTANEOUS at 13:44

## 2019-03-13 RX ADMIN — CYANOCOBALAMIN 1000 MCG: 1000 INJECTION, SOLUTION INTRAMUSCULAR; SUBCUTANEOUS at 09:52

## 2019-03-13 RX ADMIN — PROMETHAZINE HYDROCHLORIDE 12.5 MG: 25 INJECTION INTRAMUSCULAR; INTRAVENOUS at 08:26

## 2019-03-13 RX ADMIN — PROCHLORPERAZINE EDISYLATE 5 MG: 5 INJECTION INTRAMUSCULAR; INTRAVENOUS at 13:44

## 2019-03-13 NOTE — PROGRESS NOTES
"Bariatric Surgery Progress Note:      Chief Complaint:  POD #1    Subjective     Interval History:  Doing fairly well. Complains of substernal and epigastric pain/cramping.  Describes it as \"gas pain\" that is very severe.  Not much nausea, no vomiting.  Tolerating about 2 oz per hour of protein shake or water.  Has not had more to drink because she keeps falling asleep.  Has not tried oral narcotics, only IV.  No fevers.  Ambulating.  Voiding.  IS 1250 with aggressive coaching by me at bedside.  Wants to go home, disappointed that I told her she hasn't met criteria just yet.      Objective     Vital Signs  Blood pressure 140/99, pulse 101, temperature 99.3 °F (37.4 °C), temperature source Oral, resp. rate 18, height 161.3 cm (63.5\"), weight 108 kg (238 lb 8 oz), last menstrual period 03/10/2019, SpO2 100 %.      Intake/Output Summary (Last 24 hours) at 3/13/2019 1723  Last data filed at 3/13/2019 1600  Gross per 24 hour   Intake 1415 ml   Output 2800 ml   Net -1385 ml       Physical Exam:  General: Alert, NAD, on O2 via NC with 100% O2 sats.  -120 while I was in the room, higher after I had her do IS and with coughing. Came back down after finished coughing.  Lungs: Clear  Heart: RRR  Abdomen: soft, appropriate, incisions okay  Extremities: warm, (+) SCDs       Labs:  Lab Results (last 24 hours)     Procedure Component Value Units Date/Time    Iron [[id number]]  (Abnormal) Collected:  03/13/19 0701    Specimen:  Blood Updated:  03/13/19 0822     Iron 29 mcg/dL     Comprehensive Metabolic Panel [[id number]]  (Abnormal) Collected:  03/13/19 0701    Specimen:  Blood Updated:  03/13/19 0803     Glucose 139 mg/dL      BUN 10 mg/dL      Creatinine 0.95 mg/dL      Sodium 138 mmol/L      Potassium 4.4 mmol/L      Chloride 106 mmol/L      CO2 22.0 mmol/L      Calcium 8.5 mg/dL      Total Protein 6.2 g/dL      Albumin 4.34 g/dL      ALT (SGPT) 64 U/L      AST (SGOT) 36 U/L      Alkaline Phosphatase 60 U/L      Total " Bilirubin 0.4 mg/dL      eGFR Non African Amer 67 mL/min/1.73      Globulin 1.9 gm/dL      A/G Ratio 2.3 g/dL      BUN/Creatinine Ratio 10.5     Anion Gap 10.0 mmol/L     Narrative:       National [employer] Guidelines    Stage     Description        GFR  1         Normal or High     90+  2         Mild decrease      60-89  3         Moderate decrease  30-59  4         Severe decrease    15-29  5         Kidney failure     <15    The MDRD GFR formula is only valid for adults with stable renal function between ages 18 and 70.    CBC & Differential [[id number]] Collected:  03/13/19 0701    Specimen:  Blood Updated:  03/13/19 0722    Narrative:       The following orders were created for panel order CBC & Differential.  Procedure                               Abnormality         Status                     ---------                               -----------         ------                     CBC Auto Differential[[id number]]        Abnormal            Final result                 Please view results for these tests on the individual orders.    CBC Auto Differential [[id number]]  (Abnormal) Collected:  03/13/19 0701    Specimen:  Blood Updated:  03/13/19 0722     WBC 12.09 10*3/mm3      RBC 4.43 10*6/mm3      Hemoglobin 13.3 g/dL      Hematocrit 38.1 %      MCV 86.0 fL      MCH 30.0 pg      MCHC 34.9 g/dL      RDW 12.9 %      RDW-SD 40.9 fl      MPV 8.7 fL      Platelets 342 10*3/mm3      Neutrophil % 81.8 %      Lymphocyte % 10.7 %      Monocyte % 7.2 %      Eosinophil % 0.0 %      Basophil % 0.0 %      Immature Grans % 0.3 %      Neutrophils, Absolute 9.89 10*3/mm3      Lymphocytes, Absolute 1.29 10*3/mm3      Monocytes, Absolute 0.87 10*3/mm3      Eosinophils, Absolute 0.00 10*3/mm3      Basophils, Absolute 0.00 10*3/mm3      Immature Grans, Absolute 0.04 10*3/mm3     Tissue Pathology Exam [[id number]] Collected:  03/12/19 1500    Specimen:  Tissue from Stomach Updated:  03/13/19 0711            Assessment/Plan      POD # 1 s/p LSG/HHR.    Doing fairly well.  Needs to work on pulmonary toilet, ambulation, and oral intake.  D/c IV narcotics, use oral pain meds from now on.  UGI reviewed, no leak or obstruction.  IV iron given for low iron.      Continue OOB/ PT/ DVT prophx.      03/13/19  5:23 PM  Joselin Fish MD

## 2019-03-13 NOTE — PROGRESS NOTES
Discharge Planning Assessment  [facility]     Patient Name: Dusty Calhoun  MRN: [id number]  Today's Date: 3/13/2019    Admit Date: 3/12/2019    Discharge Needs Assessment     Row Name 03/13/19 1034       Living Environment    Lives With  child(feng), dependent    Name(s) of Who Lives With Patient  3 dependent children, Mom, SHELLY Calhoun    Current Living Arrangements  home/apartment/condo    Primary Care Provided by  self    Provides Primary Care For  child(feng)    Family Caregiver if Needed  parent(s)    Family Caregiver Names  Mom, SHELLY Calhoun    Quality of Family Relationships  supportive;involved;helpful    Able to Return to Prior Arrangements  yes    Living Arrangement Comments  Lives with 3 dependent childre.  Mother available to assist as needed       Resource/Environmental Concerns    Resource/Environmental Concerns  none    Transportation Concerns  car, none       Transition Planning    Patient/Family Anticipates Transition to  home    Patient/Family Anticipated Services at Transition  none    Transportation Anticipated  car, drives self       Discharge Needs Assessment    Readmission Within the Last 30 Days  no previous admission in last 30 days    Concerns to be Addressed  no discharge needs identified    Equipment Currently Used at Home  oxygen Uses 2L/NC at night only.  Does not remember the name of the company.     Anticipated Changes Related to Illness  none    Equipment Needed After Discharge  none        Discharge Plan     Row Name 03/13/19 1037       Plan    Plan  Plan is home with family at discharge    Patient/Family in Agreement with Plan  yes    Plan Comments  Met with patient at bedside.  Lives with 3 dependent children in house with 3 steps she toldrates without difficulty.  PCP is Stephen Maier.  No home health.  Has home O2 she uses at 2L[employer] at night.  Doesn't remember the name of the company.   No discharge needs identified.      Final Discharge Disposition Code  01 - home or self-care         Destination      No service coordination in this encounter.      Durable Medical Equipment      No service coordination in this encounter.      Dialysis/Infusion      No service coordination in this encounter.      Home Medical Care      No service coordination in this encounter.      Community Resources      No service coordination in this encounter.          Demographic Summary     Row Name 03/13/19 1033       General Information    Admission Type  inpatient    Arrived From  operating room    Referral Source  admission list    Reason for Consult  discharge planning    Preferred Language  English        Functional Status     Row Name 03/13/19 1033       Functional Status    Usual Activity Tolerance  good    Current Activity Tolerance  good       Functional Status, IADL    Medications  independent    Meal Preparation  independent    Housekeeping  independent    Laundry  independent    Shopping  independent        Psychosocial    No documentation.       Abuse/Neglect    No documentation.       Legal    No documentation.       Substance Abuse    No documentation.       Patient Forms    No documentation.           Monae Lin RN

## 2019-03-13 NOTE — PAYOR COMM NOTE
"Dusty Mayfield (38 y.o. Female)     Cyndee Wood, RN  [phone]  F [phone]    Initial clinicals on a scheduled IP surgery, auth [id number]      Date of Birth Social Security Number Address Home Phone MRN    1980  [id number]  [facility] [address] [phone] [id number]    Syn[employer] Marital Status          [place]ru[place] Single       Admission Date Admission Type Admitting Provider Attending Provider Department, Room/Bed    3/12/19 Elective Joselin Fish MD Quintero, Paige Nealy, MD [address], S202/1    Discharge Date Discharge Disposition Discharge Destination                       Attending Provider:  Joselin Fish MD    Allergies:  No Known Allergies    Isolation:  None   Infection:  None   Code Status:  CPR    Ht:  161.3 cm (63.5\")   Wt:  108 kg (238 lb 8 oz)    Admission Cmt:  None   Principal Problem:  Obesity, Class III, BMI 40-49.9 (morbid obesity) (CMS/HCC) [E66.01] More...                 Active Insurance as of 3/12/2019     Primary Coverage     Payor Plan Insurance Group Employer/Plan Group    [employer] [place] [employer] [place]ICA[place]      Payor Plan Address Payor Plan Phone Number Payor Plan Fax Number Effective Dates    [address] [address] [phone]  4/6/2018 - None Entered    [facility] [id number]       Subscriber Name Subscriber Birth Date Member ID       DUSTY MAYFIELD 1980 [id number]                 Emergency Contacts      (Rel.) Home Phone Work Phone Mobile Phone    Alyssa Mayfield (Mother) [phone] -- --    Virgen Bains (Friend) -- -- [phone]               History & Physical      Joselin Fish MD at 3/12/2019 12:45 PM          H&P reviewed. The patient was examined and there are no changes to the H&P.          Electronically signed by Joselin Fihs MD at 3/12/2019 12:45 PM   Source Note             [facility] BARIATRIC SURGERY  [address]  [facility] " [address]  [phone]      Patient  Name:  Dusty Calhoun  :  1980      Date of Visit: 2019      Chief Complaint:  weight gain; unable to maintain weight loss    History of Present Illness:  Dusty Calhoun is a 38 y.o. female who presents today for evaluation, education and consultation regarding weight loss surgery.     Patient has been overweight for many years, with numerous failed dietary/weight loss attempts.  She now has chronic stable obesity related comorbidities of fatty liver, GERD, fatigue, depression, suspected sleep apnea and as such has decided to pursue weight loss surgery.    Hx abnormal EKG, sees cardiologist yearly and normal stress test yearly.      Previously DM2, but stopped drinking mountain dew 20 oz x 6 per day and came off of metformin with improvement in A1C.      Some heartburn in the past, very infrequent, no meds.  Supposed to use O2 at home, does not.  A home O2 sat study showed sleep apnea.  They did not recommend CPAP machine.      No longer takes Lexapro.      Original intake  18      Review of data:    ARASH: nothing  CBC: nl  CMP: nl  EGD: PQ 37 cm, small HH, DE reflux  HP neg  Cardiac clearance: no contraindication  Echo: 2016 normal  Stress test: 2016 normal  PFTs: mild obstructive  EKG: nl  CXR: nl      Last tobacco: 7 year ago  Last NSAIDs: >1 month  Last ASA: n/a  Last steroids: n/a  Last hormones: n/a      Past Medical History:   Diagnosis Date   • Abnormal ECG     followed by cardiology annually, negative workup up per patient.    • Abnormal glucose     Was on metformin for DM, d/c soda and A1C normalized.    • Anxiety    • Boil     h/o mulitple boils, never had I&D, no known MRSA   • Depression    • Dyspepsia    • Fatigue    • Fatty liver     noted on US, confirmed during lap jessika (no Biopsy)   • Former smoker    • Hashimoto's disease     last labs showed euthyroid without meds   • Heartburn     rare, doesn't take meds typically.  No h/o EGD or  h pylori.    • Hypertension    • Morbid obesity with BMI of 40.0-44.9, adult (CMS/HCC)    • Pain in shin    • Shortness of breath    • Suspected sleep apnea     Has home oxygen testing, was on ngihttime oxygen in the past, has yet to have sleep study.    • Thyroid goiter     Followed by US annually, no procedures to date.      Past Surgical History:   Procedure Laterality Date   • LAPAROSCOPIC CHOLECYSTECTOMY  2014    cholelithiasis   • TUBAL ABDOMINAL LIGATION  2011    laparoscopic       No Known Allergies    Current Outpatient Medications:   •  Cholecalciferol (VITAMIN D3) 2000 units capsule, Take 5,000 Units by mouth Daily., Disp: , Rfl:   •  escitalopram (LEXAPRO) 10 MG tablet, Take 10 mg by mouth Daily., Disp: , Rfl:   •  lisinopril (PRINIVIL,ZESTRIL) 10 MG tablet, Take 10 mg by mouth Daily., Disp: , Rfl:   •  vitamin B-12 (CYANOCOBALAMIN) 2500 MCG sublingual tablet tablet, Place 500 mcg under the tongue 3 (Three) Times a Day., Disp: , Rfl:     Social History     Socioeconomic History   • Marital status: Single     Spouse name: Not on file   • Number of children: 3   • Years of education: High School    • Highest education level: Not on file   Social Needs   • Financial resource strain: Not on file   • Food insecurity - worry: Not on file   • Food insecurity - inability: Not on file   • Transportation needs - medical: Not on file   • Transportation needs - non-medical: Not on file   Occupational History   • Occupation: Student   Tobacco Use   • Smoking status: Former Smoker     Packs/day: 2.00     Types: Cigarettes   • Smokeless tobacco: Never Used   Substance and Sexual Activity   • Alcohol use: No   • Drug use: No   • Sexual activity: Not on file     Comment: no hormones   Other Topics Concern   • Not on file   Social History Narrative    Lives in [facility] with 3 children.  Not working, in school for associates in Science and Art.      Family History   Problem Relation Age of Onset   • Hypertension  Mother    • Hypertension Father    • Diabetes Maternal Grandmother    • Obesity Maternal Grandmother    • Heart attack Maternal Grandfather    • Breast cancer Paternal Grandmother        Review of Systems   Constitutional: Positive for fatigue and unexpected weight gain. Negative for chills, diaphoresis, fever and unexpected weight loss.   HENT: Negative for congestion, ear pain, facial swelling, rhinorrhea and sore throat.    Eyes: Negative for blurred vision, double vision and discharge.   Respiratory: Positive for shortness of breath. Negative for chest tightness, wheezing and stridor.    Cardiovascular: Negative for chest pain, palpitations and leg swelling.   Gastrointestinal: Negative for abdominal pain, blood in stool and vomiting.   Endocrine: Negative for polydipsia.   Genitourinary: Negative for hematuria.   Musculoskeletal: Positive for arthralgias. Negative for neck pain.   Skin: Negative for color change and rash.   Allergic/Immunologic: Negative for immunocompromised state.   Neurological: Negative for confusion.   Psychiatric/Behavioral: Negative for self-injury.       Physical Exam:  Vital Signs:  Weight: 108 kg (238 lb 8 oz)   Body mass index is 41.59 kg/m².  Temp: 98.3 °F (36.8 °C)   Heart Rate: 78   BP: 124/74     Physical Exam   Constitutional: She is oriented to person, place, and time. She appears well-developed and well-nourished.   HENT:   Head: Normocephalic and atraumatic.   Nose: Nose normal.   Eyes: Conjunctivae and EOM are normal. Pupils are equal, round, and reactive to light.   Neck: Normal range of motion. Neck supple. Carotid bruit is not present. No tracheal deviation present.   Thyroid slightly full on the left   Cardiovascular: Normal rate, regular rhythm and normal heart sounds.   Pulmonary/Chest: Effort normal and breath sounds normal. No respiratory distress.   Abdominal: Soft. She exhibits no distension. There is no hepatosplenomegaly. There is no tenderness.   Scattered lap  scars   Musculoskeletal: Normal range of motion. She exhibits no edema or deformity.   Neurological: She is alert and oriented to person, place, and time. No cranial nerve deficit. Coordination normal.   Skin: Skin is warm and dry. No rash noted.   Psychiatric: She has a normal mood and affect. Her behavior is normal. Judgment and thought content normal.   Vitals reviewed.      Patient Active Problem List   Diagnosis   • Hashimoto's disease   • Thyroid goiter   • Fatty liver   • Dyspepsia   • Fatigue   • Morbid obesity with BMI of 40.0-44.9, adult (CMS/HCC)   • Pain in shin   • Shortness of breath   • Heartburn   • Abnormal ECG   • Anxiety   • Depression   • Boil   • Former smoker   • Suspected sleep apnea   • Abnormal glucose       Assessment:    Dusty Calhoun is a 38 y.o. year old female with medically complicated obesity.    Weight loss surgery is deemed medically necessary given the following obesity related comorbidities including fatty liver, GERD, fatigue, depression, suspected sleep apnea  with current Weight: 108 kg (238 lb 8 oz) and Body mass index is 41.59 kg/m²..    Patient is aware that surgery is a tool, and that weight loss is not guaranteed but only seen in the context of appropriate use, follow up and exercise.    The patient was present for an approximately a 2.5 hour discussion of the purpose of weight loss surgery, how WLS is a tool to assist in achieving weight loss goals, the most common complications and how best to avoid them, and the strategies for short and long term weight loss.  Ample opportunity to discuss questions was available both in group and during the time of individual examination.    I reviewed all available preop labs, Xrays, tests, clearances, etc and signed off on these in the record.  All of this in addition to the patient's unique history and exam has been taken into consideration in determining their appropriate candidacy for weight loss surgery.    Complications  of  "laparoscopic/possible robotic gastric sleeve were discussed. The patient is well aware of the potential complications of surgery that include but not limited to bleeding, infections, deep venous thrombosis, pulmonary embolism, pulmonary complications such as pneumonia, cardiac events, hernias, small bowel obstruction, damage to the spleen or other organs, bowel injury, disfiguring scars, failure to lose weight, need for additional surgery, conversion to an open procedure, and death. Patient is also aware of complications which apply in this particular procedure that can include but are not limited to a \"leak\" at the staple line which in some instances may require conversion to gastric bypass.    The patient is aware if a hiatal hernia is encountered, it likely will be repaired.  R/B/A Rx to hiatal hernia repair were discussed as outlined in our long consent form.  Briefly risks in addition to those for LSG include recurrent hernia, RACHAEL, dysphagia, esophageal injury, pneumothorax, injury to the vagus nerves, injury to the thoracic duct, aorta or vena cava.    Greater than 3 minutes was spent with the patient discussing avoiding all tobacco products and second hand smoke at least 2 weeks pre-operatively and 6 weeks post-operatively to minimize the risk of sleeve leak.  This included discussing the importance of avoiding even secondhand smoke as the risk of leak is increased.  Examples discussed:  I made it very clear that the patient understands they should avoid even riding in a car where someone has previously smoked in the last 2 weeks, living in a house where someone smokes (even if it's in a separate room/patio/attached garage, etc.) we discussed that they should not have a conversation with a group of people who are smoking even if it's outside.  They can be around wood burning fires and barbecue.  I told them I do not know if marijuana has a same effects but my overall recommendation is to avoid it for 2 weeks " prior in 6 weeks after surgery.  They also are aware that nicotine may also increase the risk of leak and I strongly encouraged him to avoid that as well for 2 weeks prior in 6 weeks after surgery.    Discussed the risks, benefits and alternative therapies at great length as outlined in our extensive consent forms, consent videos, and educational teaching process under the direction of the center's .    A copy of the patient's signed informed consent is on file.    Plan:  Laparoscopic sleeve gastrectomy + HHR at [facility].   Probably undiagnosed sleep apnea.      R/B/A Rx discussed to postop anticoagulation incl but not limited to bleeding, drug reaction, venothromboembolic events, etc. and she declined.  No personal or family hx of VTE, no hx MI.      MDM high:  Elective procedure with the following risk factors: morbid obesity  4+ chronic medical problems reviewed.        Joselin Fish MD                                                Electronically signed by Joselin Fish MD at 2019  2:25 PM             Joselin Fish MD at 2019  2:25 PM          [facility] BARIATRIC SURGERY  [address] [address]  [phone]      Patient  Name:  Dusty Calhoun  :  1980      Date of Visit: 2019      Chief Complaint:  weight gain; unable to maintain weight loss    History of Present Illness:  Dusty Calhoun is a 38 y.o. female who presents today for evaluation, education and consultation regarding weight loss surgery.     Patient has been overweight for many years, with numerous failed dietary/weight loss attempts.  She now has chronic stable obesity related comorbidities of fatty liver, GERD, fatigue, depression, suspected sleep apnea and as such has decided to pursue weight loss surgery.    Hx abnormal EKG, sees cardiologist yearly and normal stress test yearly.      Previously DM2, but stopped drinking mountain dew  20 oz x 6 per day and came off of metformin with improvement in A1C.      Some heartburn in the past, very infrequent, no meds.  Supposed to use O2 at home, does not.  A home O2 sat study showed sleep apnea.  They did not recommend CPAP machine.      No longer takes Lexapro.      Original intake  5/22/18      Review of data:    ARASH: nothing  CBC: nl  CMP: nl  EGD: PQ 37 cm, small HH, DE reflux  HP neg  Cardiac clearance: no contraindication  Echo: 12/2016 normal  Stress test: 12/2016 normal  PFTs: mild obstructive  EKG: nl  CXR: nl      Last tobacco: 7 year ago  Last NSAIDs: >1 month  Last ASA: n/a  Last steroids: n/a  Last hormones: n/a      Past Medical History:   Diagnosis Date   • Abnormal ECG     followed by cardiology annually, negative workup up per patient.    • Abnormal glucose     Was on metformin for DM, d/c soda and A1C normalized.    • Anxiety    • Boil     h/o mulitple boils, never had I&D, no known MRSA   • Depression    • Dyspepsia    • Fatigue    • Fatty liver     noted on US, confirmed during lap jessika (no Biopsy)   • Former smoker    • Hashimoto's disease     last labs showed euthyroid without meds   • Heartburn     rare, doesn't take meds typically.  No h/o EGD or h pylori.    • Hypertension    • Morbid obesity with BMI of 40.0-44.9, adult (CMS/HCC)    • Pain in shin    • Shortness of breath    • Suspected sleep apnea     Has home oxygen testing, was on ngihttime oxygen in the past, has yet to have sleep study.    • Thyroid goiter     Followed by US annually, no procedures to date.      Past Surgical History:   Procedure Laterality Date   • LAPAROSCOPIC CHOLECYSTECTOMY  2014    cholelithiasis   • TUBAL ABDOMINAL LIGATION  2011    laparoscopic       No Known Allergies    Current Outpatient Medications:   •  Cholecalciferol (VITAMIN D3) 2000 units capsule, Take 5,000 Units by mouth Daily., Disp: , Rfl:   •  escitalopram (LEXAPRO) 10 MG tablet, Take 10 mg by mouth Daily., Disp: , Rfl:   •   lisinopril (PRINIVIL,ZESTRIL) 10 MG tablet, Take 10 mg by mouth Daily., Disp: , Rfl:   •  vitamin B-12 (CYANOCOBALAMIN) 2500 MCG sublingual tablet tablet, Place 500 mcg under the tongue 3 (Three) Times a Day., Disp: , Rfl:     Social History     Socioeconomic History   • Marital status: Single     Spouse name: Not on file   • Number of children: 3   • Years of education: High School    • Highest education level: Not on file   Social Needs   • Financial resource strain: Not on file   • Food insecurity - worry: Not on file   • Food insecurity - inability: Not on file   • Transportation needs - medical: Not on file   • Transportation needs - non-medical: Not on file   Occupational History   • Occupation: Student   Tobacco Use   • Smoking status: Former Smoker     Packs/day: 2.00     Types: Cigarettes   • Smokeless tobacco: Never Used   Substance and Sexual Activity   • Alcohol use: No   • Drug use: No   • Sexual activity: Not on file     Comment: no hormones   Other Topics Concern   • Not on file   Social History Narrative    Lives in [facility] with 3 children.  Not working, in school for associates in Science and Art.      Family History   Problem Relation Age of Onset   • Hypertension Mother    • Hypertension Father    • Diabetes Maternal Grandmother    • Obesity Maternal Grandmother    • Heart attack Maternal Grandfather    • Breast cancer Paternal Grandmother        Review of Systems   Constitutional: Positive for fatigue and unexpected weight gain. Negative for chills, diaphoresis, fever and unexpected weight loss.   HENT: Negative for congestion, ear pain, facial swelling, rhinorrhea and sore throat.    Eyes: Negative for blurred vision, double vision and discharge.   Respiratory: Positive for shortness of breath. Negative for chest tightness, wheezing and stridor.    Cardiovascular: Negative for chest pain, palpitations and leg swelling.   Gastrointestinal: Negative for abdominal pain, blood in stool and  vomiting.   Endocrine: Negative for polydipsia.   Genitourinary: Negative for hematuria.   Musculoskeletal: Positive for arthralgias. Negative for neck pain.   Skin: Negative for color change and rash.   Allergic/Immunologic: Negative for immunocompromised state.   Neurological: Negative for confusion.   Psychiatric/Behavioral: Negative for self-injury.       Physical Exam:  Vital Signs:  Weight: 108 kg (238 lb 8 oz)   Body mass index is 41.59 kg/m².  Temp: 98.3 °F (36.8 °C)   Heart Rate: 78   BP: 124/74     Physical Exam   Constitutional: She is oriented to person, place, and time. She appears well-developed and well-nourished.   HENT:   Head: Normocephalic and atraumatic.   Nose: Nose normal.   Eyes: Conjunctivae and EOM are normal. Pupils are equal, round, and reactive to light.   Neck: Normal range of motion. Neck supple. Carotid bruit is not present. No tracheal deviation present.   Thyroid slightly full on the left   Cardiovascular: Normal rate, regular rhythm and normal heart sounds.   Pulmonary/Chest: Effort normal and breath sounds normal. No respiratory distress.   Abdominal: Soft. She exhibits no distension. There is no hepatosplenomegaly. There is no tenderness.   Scattered lap scars   Musculoskeletal: Normal range of motion. She exhibits no edema or deformity.   Neurological: She is alert and oriented to person, place, and time. No cranial nerve deficit. Coordination normal.   Skin: Skin is warm and dry. No rash noted.   Psychiatric: She has a normal mood and affect. Her behavior is normal. Judgment and thought content normal.   Vitals reviewed.      Patient Active Problem List   Diagnosis   • Hashimoto's disease   • Thyroid goiter   • Fatty liver   • Dyspepsia   • Fatigue   • Morbid obesity with BMI of 40.0-44.9, adult (CMS/HCC)   • Pain in shin   • Shortness of breath   • Heartburn   • Abnormal ECG   • Anxiety   • Depression   • Boil   • Former smoker   • Suspected sleep apnea   • Abnormal glucose  "      Assessment:    Dusty Calhoun is a 38 y.o. year old female with medically complicated obesity.    Weight loss surgery is deemed medically necessary given the following obesity related comorbidities including fatty liver, GERD, fatigue, depression, suspected sleep apnea  with current Weight: 108 kg (238 lb 8 oz) and Body mass index is 41.59 kg/m²..    Patient is aware that surgery is a tool, and that weight loss is not guaranteed but only seen in the context of appropriate use, follow up and exercise.    The patient was present for an approximately a 2.5 hour discussion of the purpose of weight loss surgery, how WLS is a tool to assist in achieving weight loss goals, the most common complications and how best to avoid them, and the strategies for short and long term weight loss.  Ample opportunity to discuss questions was available both in group and during the time of individual examination.    I reviewed all available preop labs, Xrays, tests, clearances, etc and signed off on these in the record.  All of this in addition to the patient's unique history and exam has been taken into consideration in determining their appropriate candidacy for weight loss surgery.    Complications  of laparoscopic/possible robotic gastric sleeve were discussed. The patient is well aware of the potential complications of surgery that include but not limited to bleeding, infections, deep venous thrombosis, pulmonary embolism, pulmonary complications such as pneumonia, cardiac events, hernias, small bowel obstruction, damage to the spleen or other organs, bowel injury, disfiguring scars, failure to lose weight, need for additional surgery, conversion to an open procedure, and death. Patient is also aware of complications which apply in this particular procedure that can include but are not limited to a \"leak\" at the staple line which in some instances may require conversion to gastric bypass.    The patient is aware if a hiatal " hernia is encountered, it likely will be repaired.  R/B/A Rx to hiatal hernia repair were discussed as outlined in our long consent form.  Briefly risks in addition to those for LSG include recurrent hernia, RACHAEL, dysphagia, esophageal injury, pneumothorax, injury to the vagus nerves, injury to the thoracic duct, aorta or vena cava.    Greater than 3 minutes was spent with the patient discussing avoiding all tobacco products and second hand smoke at least 2 weeks pre-operatively and 6 weeks post-operatively to minimize the risk of sleeve leak.  This included discussing the importance of avoiding even secondhand smoke as the risk of leak is increased.  Examples discussed:  I made it very clear that the patient understands they should avoid even riding in a car where someone has previously smoked in the last 2 weeks, living in a house where someone smokes (even if it's in a separate room/patio/attached garage, etc.) we discussed that they should not have a conversation with a group of people who are smoking even if it's outside.  They can be around wood burning fires and barbecue.  I told them I do not know if marijuana has a same effects but my overall recommendation is to avoid it for 2 weeks prior in 6 weeks after surgery.  They also are aware that nicotine may also increase the risk of leak and I strongly encouraged him to avoid that as well for 2 weeks prior in 6 weeks after surgery.    Discussed the risks, benefits and alternative therapies at great length as outlined in our extensive consent forms, consent videos, and educational teaching process under the direction of the center's .    A copy of the patient's signed informed consent is on file.    Plan:  Laparoscopic sleeve gastrectomy + HHR at [facility].   Probably undiagnosed sleep apnea.      R/B/A Rx discussed to postop anticoagulation incl but not limited to bleeding, drug reaction, venothromboembolic events, etc. and she declined.  No  personal or family hx of VTE, no hx MI.      MDM high:  Elective procedure with the following risk factors: morbid obesity  4+ chronic medical problems reviewed.        Joselin Fish MD                                               Electronically signed by Joselin Fish MD at 2/25/2019  2:25 PM       ICU Vital Signs     Row Name 03/13/19 0726 03/13/19 0600 03/13/19 0400 03/13/19 0310 03/13/19 0305       Vitals    Temp  98.6 °F (37 °C)  --  --  --  98.6 °F (37 °C)    Temp src  Oral  --  --  --  Oral    Pulse  104  --  --  101  105    Heart Rate Source  Monitor  --  --  --  Monitor    Resp  18  --  --  --  18    Resp Rate Source  Visual  --  --  --  Visual    BP  143/89  --  --  166/107  (Abnormal)   166/107  (Abnormal)     Noninvasive MAP (mmHg)  107  --  --  --  133    BP Location  Left arm  --  --  --  Right arm    BP Method  Automatic  --  --  --  Automatic    Patient Position  Lying  --  --  --  Lying       Oxygen Therapy    SpO2  100 %  --  --  --  98 %    Pulse Oximetry Type  Continuous  --  --  --  --    Device (Oxygen Therapy)  nasal cannula  nasal cannula  nasal cannula  --  --    Flow (L/min)  3  2  2  --  --    Row Name 03/13/19 0200 03/13/19 0000 03/12/19 2305 03/12/19 2200 03/12/19 2000       Vitals    Temp  --  --  98 °F (36.7 °C)  --  --    Temp src  --  --  Oral  --  --    Pulse  --  --  113  --  --    Heart Rate Source  --  --  Monitor  --  --    Resp  --  --  18  --  --    Resp Rate Source  --  --  Visual  --  --    BP  --  --  160/101  (Abnormal)   --  --    Noninvasive MAP (mmHg)  --  --  113  --  --    BP Location  --  --  Right arm  --  --    BP Method  --  --  Automatic  --  --    Patient Position  --  --  Lying  --  --       Oxygen Therapy    SpO2  --  --  98 %  --  --    Device (Oxygen Therapy)  nasal cannula  nasal cannula  --  nasal cannula  nasal cannula    Flow (L/min)  2  2  --  2  2    Row Name 03/12/19 1905 03/12/19 1723 03/12/19 1720 03/12/19 1700 03/12/19 1647        "Vitals    Temp  98.5 °F (36.9 °C)  97.7 °F (36.5 °C)  --  98.4 °F (36.9 °C)  98.3 °F (36.8 °C)    Temp src  Oral  Oral  --  Temporal  Temporal    Pulse  86  85  --  83  88    Heart Rate Source  Monitor  Monitor  --  Monitor  Monitor    Resp  18  18  --  16  16    Resp Rate Source  Visual  Visual  --  Visual  Visual    BP  140/90  120/71  --  127/74  129/72    Noninvasive MAP (mmHg)  104  86  --  95  90    BP Location  Right arm  Right arm  --  --  --    BP Method  Automatic  Automatic  --  --  --    Patient Position  Lying  Lying  --  --  --       Oxygen Therapy    SpO2  98 %  97 %  --  98 %  99 %    Pulse Oximetry Type  --  --  --  Continuous  Continuous    Device (Oxygen Therapy)  --  --  nasal cannula  nasal cannula  nasal cannula    Flow (L/min)  --  --  3  3  3    Row Name 03/12/19 1630 03/12/19 1618 03/12/19 0849             Height and Weight    Height  --  --  161.3 cm (63.5\")      Height Method  --  --  Stated      Weight  --  --  108 kg (238 lb 8 oz)      Weight Method  --  --  Stated      Ideal Body Weight (IBW) (kg)  --  --  53.86      BSA (Calculated - sq m)  --  --  2.1 sq meters      BMI (Calculated)  --  --  41.6      Weight in (lb) to have BMI = 25  --  --  143.1         Vitals    Temp  98.2 °F (36.8 °C)  98 °F (36.7 °C)  98.6 °F (37 °C)      Temp src  Temporal  Temporal  Temporal      Pulse  86  102  108      Heart Rate Source  Monitor  Monitor  Monitor      Resp  16  16  18      Resp Rate Source  Visual  Visual  Visual      BP  116/58  106/50  142/99      Noninvasive MAP (mmHg)  78  68  --      BP Location  --  --  Right arm      BP Method  --  --  Automatic      Patient Position  --  --  Lying         Oxygen Therapy    SpO2  97 %  96 %  98 %      Pulse Oximetry Type  Continuous  Continuous  Continuous      Device (Oxygen Therapy)  nasal cannula  nasal cannula  room air      Flow (L/min)  4  4  --          Hospital Medications (active)       Dose Frequency Start End    bupivacaine PF (MARCAINE) " 0.25 % injection   3/12/2019 3/12/2019    Sig - Route: Inject  as directed. - Injection    buprenorphine (BUPRENEX) injection   3/12/2019 3/12/2019    Sig - Route: Inject  into the appropriate muscle as directed by prescriber. - Intramuscular    ceFAZolin in dextrose (ANCEF) IVPB solution 2 g 2 g 30 min pre-op 3/12/2019 3/12/2019    Sig - Route: Infuse 100 mL into a venous catheter 30 Min Pre-Op. - Intravenous    ceFAZolin in dextrose (ANCEF) IVPB solution 2 g 2 g Every 8 Hours 3/12/2019 3/13/2019    Sig - Route: Infuse 100 mL into a venous catheter Every 8 (Eight) Hours. - Intravenous    chlorhexidine (PERIDEX) 0.12 % solution 15 mL 15 mL Every 5 Minutes 3/12/2019 3/12/2019    Sig - Route: Apply 15 mL to the mouth or throat Every 5 (Five) Minutes. - Mouth/Throat    cyanocobalamin injection 1,000 mcg 1,000 mcg Once 3/13/2019     Sig - Route: Inject 1 mL into the appropriate muscle as directed by prescriber 1 (One) Time. - Intramuscular    dexamethasone sodium phosphate injection   3/12/2019 3/12/2019    Sig - Route: Inject  into the appropriate joint as directed by provider. - Intra-articular    diphenhydrAMINE (BENADRYL) injection 25 mg 25 mg Every 4 Hours PRN 3/12/2019     Sig - Route: Infuse 0.5 mL into a venous catheter Every 4 (Four) Hours As Needed for Itching. - Intravenous    enoxaparin (LOVENOX) syringe 40 mg 40 mg Daily 3/13/2019     Sig - Route: Inject 0.4 mL under the skin into the appropriate area as directed Daily. - Subcutaneous    ferric gluconate (FERRLECIT) 125 mg in sodium chloride 0.9 % 110 mL IVPB 125 mg Once As Needed 3/13/2019     Sig - Route: Infuse 125 mg into a venous catheter 1 (One) Time As Needed (Iron < 50). - Intravenous    hydrALAZINE (APRESOLINE) injection 10 mg 10 mg Every 30 Minutes PRN 3/12/2019     Sig - Route: Infuse 0.5 mL into a venous catheter Every 30 (Thirty) Minutes As Needed for High Blood Pressure. - Intravenous    HYDROcodone-acetaminophen (NORCO) 7.5-325 MG per tablet  "1 tablet 1 tablet Every 4 Hours PRN 3/12/2019 3/22/2019    Sig - Route: Take 1 tablet by mouth Every 4 (Four) Hours As Needed for Moderate Pain . - Oral    HYDROmorphone (DILAUDID) injection 0.5 mg 0.5 mg Every 2 Hours PRN 3/12/2019 3/22/2019    Sig - Route: Infuse 0.5 mL into a venous catheter Every 2 (Two) Hours As Needed for Severe Pain . - Intravenous    Linked Group 1:  \"And\" Linked Group Details        HYDROmorphone (DILAUDID) tablet 2 mg 2 mg Every 3 Hours PRN 3/12/2019 3/22/2019    Sig - Route: Take 1 tablet by mouth Every 3 (Three) Hours As Needed for Moderate Pain . - Oral    lactated ringers infusion 150 mL/hr Continuous 3/12/2019 3/13/2019    Sig - Route: Infuse 150 mL/hr into a venous catheter Continuous. - Intravenous    levothyroxine (SYNTHROID, LEVOTHROID) tablet 50 mcg 50 mcg Every Early Morning 3/13/2019     Sig - Route: Take 1 tablet by mouth Every Morning. - Oral    lidocaine PF 1% (XYLOCAINE) injection 0.5 mL 0.5 mL Once As Needed 3/12/2019 3/12/2019    Sig - Route: Inject 0.5 mL as directed 1 (One) Time As Needed (IV Start). - Injection    lisinopril (PRINIVIL,ZESTRIL) tablet 10 mg 10 mg Daily 3/13/2019     Sig - Route: Take 1 tablet by mouth Daily. - Oral    metoclopramide (REGLAN) injection 10 mg 10 mg Every 6 Hours PRN 3/12/2019     Sig - Route: Infuse 2 mL into a venous catheter Every 6 (Six) Hours As Needed for Nausea or Vomiting. - Intravenous    multiple vitamin (M.V.I. Adult) 10 mL, thiamine (B-1) 100 mg, folic acid 1 mg in sodium chloride 0.9 % 1,000 mL infusion 100 mL/hr Once 3/13/2019 3/13/2019    Sig - Route: Infuse 100 mL/hr into a venous catheter 1 (One) Time. - Intravenous    naloxone (NARCAN) injection 0.1 mg 0.1 mg Every 5 Minutes PRN 3/12/2019     Sig - Route: Infuse 0.25 mL into a venous catheter Every 5 (Five) Minutes As Needed for Respiratory Depression. - Intravenous    Linked Group 1:  \"And\" Linked Group Details        ondansetron (ZOFRAN) injection 4 mg 4 mg Once As " "Needed 3/12/2019 3/12/2019    Sig - Route: Infuse 2 mL into a venous catheter 1 (One) Time As Needed for Nausea or Vomiting. - Intravenous    ondansetron (ZOFRAN) injection 4 mg 4 mg Every 6 Hours PRN 3/12/2019     Sig - Route: Infuse 2 mL into a venous catheter Every 6 (Six) Hours As Needed for Nausea or Vomiting. - Intravenous    Linked Group 2:  \"Or\" Linked Group Details        ondansetron (ZOFRAN) tablet 4 mg 4 mg Every 6 Hours PRN 3/12/2019     Sig - Route: Take 1 tablet by mouth Every 6 (Six) Hours As Needed for Nausea or Vomiting. - Oral    Linked Group 2:  \"Or\" Linked Group Details        pantoprazole (PROTONIX) injection 40 mg 40 mg Once 3/12/2019 3/12/2019    Sig - Route: Infuse 10 mL into a venous catheter 1 (One) Time. - Intravenous    pantoprazole (PROTONIX) injection 40 mg 40 mg Every Early Morning 3/13/2019     Sig - Route: Infuse 10 mL into a venous catheter Every Morning. - Intravenous    phenol (CHLORASEPTIC) 1.4 % liquid 2 spray 2 spray Every 2 Hours PRN 3/12/2019     Sig - Route: Apply 2 sprays to the mouth or throat Every 2 (Two) Hours As Needed for Sore Throat. - Mouth/Throat    prochlorperazine (COMPAZINE) injection 5 mg 5 mg Every 6 Hours PRN 3/12/2019     Sig - Route: Infuse 1 mL into a venous catheter Every 6 (Six) Hours As Needed for Nausea or Vomiting. - Intravenous    Linked Group 3:  \"Or\" Linked Group Details        prochlorperazine (COMPAZINE) suppository 25 mg 25 mg Every 12 Hours PRN 3/12/2019     Sig - Route: Insert 1 suppository into the rectum Every 12 (Twelve) Hours As Needed for Nausea or Vomiting. - Rectal    Linked Group 3:  \"Or\" Linked Group Details        prochlorperazine (COMPAZINE) tablet 5 mg 5 mg Every 6 Hours PRN 3/12/2019     Sig - Route: Take 1 tablet by mouth Every 6 (Six) Hours As Needed for Nausea or Vomiting. - Oral    Linked Group 3:  \"Or\" Linked Group Details        promethazine (PHENERGAN) injection 12.5 mg 12.5 mg Every 6 Hours PRN 3/12/2019     Sig - Route: " "Infuse 0.5 mL into a venous catheter Every 6 (Six) Hours As Needed for Nausea or Vomiting. - Intravenous    Linked Group 4:  \"Or\" Linked Group Details        promethazine (PHENERGAN) injection 12.5 mg 12.5 mg Every 6 Hours PRN 3/12/2019     Sig - Route: Inject 0.5 mL into the appropriate muscle as directed by prescriber Every 6 (Six) Hours As Needed for Nausea or Vomiting. - Intramuscular    Linked Group 4:  \"Or\" Linked Group Details        promethazine (PHENERGAN) tablet 12.5 mg 12.5 mg Every 6 Hours PRN 3/12/2019     Sig - Route: Take 0.5 tablets by mouth Every 6 (Six) Hours As Needed for Nausea or Vomiting. - Oral    simethicone (MYLICON) chewable tablet 80 mg 80 mg 4 Times Daily PRN 3/12/2019     Sig - Route: Chew 1 tablet 4 (Four) Times a Day As Needed for Flatulence. - Oral    sodium chloride 0.45 % with KCl 20 mEq/L infusion 100 mL/hr Continuous 3/13/2019     Sig - Route: Infuse 100 mL/hr into a venous catheter Continuous. - Intravenous    sodium chloride 0.9 % bolus 1,000 mL 1,000 mL Once 3/12/2019 3/12/2019    Sig - Route: Infuse 1,000 mL into a venous catheter 1 (One) Time. - Intravenous    sodium chloride 0.9 % flush 1-10 mL 1-10 mL As Needed 3/12/2019     Sig - Route: Infuse 1-10 mL into a venous catheter As Needed for Line Care. - Intravenous    sodium chloride 0.9 % flush 3 mL 3 mL Every 12 Hours Scheduled 3/12/2019     Sig - Route: Infuse 3 mL into a venous catheter Every 12 (Twelve) Hours. - Intravenous    dexamethasone (DECADRON) injection (Discontinued)  As Needed 3/12/2019 3/12/2019    Sig - Route: Infuse  into a venous catheter As Needed. - Intravenous    Reason for Discontinue: Anesthesia Stop    enoxaparin (LOVENOX) syringe 40 mg (Discontinued) 40 mg Once 3/12/2019 3/12/2019    Sig - Route: Inject 0.4 mL under the skin into the appropriate area as directed 1 (One) Time. - Subcutaneous    Reason for Discontinue: Patient Transfer    enoxaparin (LOVENOX) syringe (Discontinued)  As Needed 3/12/2019 " 3/12/2019    Sig: As Needed.    Reason for Discontinue: Patient Discharge    esmolol (BREVIBLOC) injection (Discontinued)  As Needed 3/12/2019 3/12/2019    Sig: As Needed.    Reason for Discontinue: Anesthesia Stop    fentaNYL citrate (PF) (SUBLIMAZE) injection 50 mcg (Discontinued) 50 mcg Every 5 Minutes PRN 3/12/2019 3/12/2019    Sig - Route: Infuse 1 mL into a venous catheter Every 5 (Five) Minutes As Needed for Moderate Pain . - Intravenous    Reason for Discontinue: Patient Transfer    fibrin sealant component 4 ML external kit (Discontinued)  As Needed 3/12/2019 3/12/2019    Sig: As Needed.    Reason for Discontinue: Patient Discharge    glycopyrrolate (ROBINUL) injection (Discontinued)  As Needed 3/12/2019 3/12/2019    Sig - Route: Infuse  into a venous catheter As Needed. - Intravenous    Reason for Discontinue: Anesthesia Stop    lactated ringers bolus 500 mL (Discontinued) 500 mL Once As Needed 3/12/2019 3/12/2019    Sig - Route: Infuse 500 mL into a venous catheter 1 (One) Time As Needed (for hypovolemia, call anesthesiologist before initiating). - Intravenous    Reason for Discontinue: Patient Transfer    lactated ringers infusion (Discontinued) 9 mL/hr Continuous 3/12/2019 3/12/2019    Sig - Route: Infuse 9 mL/hr into a venous catheter Continuous. - Intravenous    lactated ringers infusion (Discontinued)  Continuous PRN 3/12/2019 3/12/2019    Sig - Route: Infuse  into a venous catheter Continuous As Needed. - Intravenous    Reason for Discontinue: Anesthesia Stop    lidocaine (XYLOCAINE) 1 % injection (Discontinued)  As Needed 3/12/2019 3/12/2019    Sig: As Needed.    Reason for Discontinue: Anesthesia Stop    Neostigmine Methylsulfate solution prefilled syringe (Discontinued)  As Needed 3/12/2019 3/12/2019    Sig - Route: Infuse  into a venous catheter As Needed. - Intravenous    Reason for Discontinue: Anesthesia Stop    ondansetron (ZOFRAN) injection (Discontinued)  As Needed 3/12/2019 3/12/2019     Sig - Route: Infuse  into a venous catheter As Needed. - Intravenous    Reason for Discontinue: Anesthesia Stop    Propofol (DIPRIVAN) injection (Discontinued)  As Needed 3/12/2019 3/12/2019    Sig - Route: Infuse  into a venous catheter As Needed. - Intravenous    Reason for Discontinue: Anesthesia Stop    Propofol (DIPRIVAN) injection (Discontinued)  Continuous PRN 3/12/2019 3/12/2019    Sig: Continuous As Needed.    Reason for Discontinue: Anesthesia Stop    rocuronium (ZEMURON) injection (Discontinued)  As Needed 3/12/2019 3/12/2019    Sig - Route: Infuse  into a venous catheter As Needed. - Intravenous    Reason for Discontinue: Anesthesia Stop    Scopolamine (TRANSDERM-SCOP) 1.5 MG/3DAYS patch 1 patch (Discontinued) 1 patch Once 3/12/2019 3/12/2019    Sig - Route: Place 1 patch on the skin as directed by provider 1 (One) Time. - Transdermal    sodium chloride (NS) irrigation solution (Discontinued)  As Needed 3/12/2019 3/12/2019    Sig: As Needed.    Reason for Discontinue: Patient Discharge    sodium chloride 0.9 % flush 3 mL (Discontinued) 3 mL Every 12 Hours Scheduled 3/12/2019 3/12/2019    Sig - Route: Infuse 3 mL into a venous catheter Every 12 (Twelve) Hours. - Intravenous    Reason for Discontinue: Patient Transfer    sodium chloride 0.9 % flush 3-10 mL (Discontinued) 3-10 mL As Needed 3/12/2019 3/12/2019    Sig - Route: Infuse 3-10 mL into a venous catheter As Needed for Line Care. - Intravenous    Reason for Discontinue: Patient Transfer    sodium chloride 0.9 % infusion (Discontinued) 150 mL/hr Continuous 3/12/2019 3/12/2019    Sig - Route: Infuse 150 mL/hr into a venous catheter Continuous. - Intravenous    Reason for Discontinue: Patient Transfer    sterile water irrigation solution (Discontinued)  As Needed 3/12/2019 3/12/2019    Sig: As Needed.    Reason for Discontinue: Patient Discharge            Lab Results (last 24 hours)     Procedure Component Value Units Date/Time    Iron [[id number]]   (Abnormal) Collected:  03/13/19 0701    Specimen:  Blood Updated:  03/13/19 0822     Iron 29 mcg/dL     Comprehensive Metabolic Panel [[id number]]  (Abnormal) Collected:  03/13/19 0701    Specimen:  Blood Updated:  03/13/19 0803     Glucose 139 mg/dL      BUN 10 mg/dL      Creatinine 0.95 mg/dL      Sodium 138 mmol/L      Potassium 4.4 mmol/L      Chloride 106 mmol/L      CO2 22.0 mmol/L      Calcium 8.5 mg/dL      Total Protein 6.2 g/dL      Albumin 4.34 g/dL      ALT (SGPT) 64 U/L      AST (SGOT) 36 U/L      Alkaline Phosphatase 60 U/L      Total Bilirubin 0.4 mg/dL      eGFR Non African Amer 67 mL/min/1.73      Globulin 1.9 gm/dL      A/G Ratio 2.3 g/dL      BUN/Creatinine Ratio 10.5     Anion Gap 10.0 mmol/L     Narrative:       [employer] Guidelines    Stage     Description        GFR  1         Normal or High     90+  2         Mild decrease      60-89  3         Moderate decrease  30-59  4         Severe decrease    15-29  5         Kidney failure     <15    The MDRD GFR formula is only valid for adults with stable renal function between ages 18 and 70.    CBC & Differential [[id number]] Collected:  03/13/19 0701    Specimen:  Blood Updated:  03/13/19 0722    Narrative:       The following orders were created for panel order CBC & Differential.  Procedure                               Abnormality         Status                     ---------                               -----------         ------                     CBC Auto Differential[[id number]]        Abnormal            Final result                 Please view results for these tests on the individual orders.    CBC Auto Differential [[id number]]  (Abnormal) Collected:  03/13/19 0701    Specimen:  Blood Updated:  03/13/19 0722     WBC 12.09 10*3/mm3      RBC 4.43 10*6/mm3      Hemoglobin 13.3 g/dL      Hematocrit 38.1 %      MCV 86.0 fL      MCH 30.0 pg      MCHC 34.9 g/dL      RDW 12.9 %      RDW-SD 40.9 fl      MPV 8.7 fL      Platelets  342 10*3/mm3      Neutrophil % 81.8 %      Lymphocyte % 10.7 %      Monocyte % 7.2 %      Eosinophil % 0.0 %      Basophil % 0.0 %      Immature Grans % 0.3 %      Neutrophils, Absolute 9.89 10*3/mm3      Lymphocytes, Absolute 1.29 10*3/mm3      Monocytes, Absolute 0.87 10*3/mm3      Eosinophils, Absolute 0.00 10*3/mm3      Basophils, Absolute 0.00 10*3/mm3      Immature Grans, Absolute 0.04 10*3/mm3     Tissue Pathology Exam [[id number]] Collected:  03/12/19 1500    Specimen:  Tissue from Stomach Updated:  03/13/19 0711    POC Urine Pregnancy Test [[id number]] Collected:  03/12/19 0907    Specimen:  Urine Updated:  03/12/19 0907     HCG, Urine, QL Negative     Lot Number [id number] exp 6-20     Internal Positive Control Positive     Internal Negative Control Negative        Imaging Results (last 24 hours)     ** No results found for the last 24 hours. **           Operative/Procedure Notes (all)      Joselin Fish MD at 3/12/2019 11:23 AM  Version 1 of 1         GASTRIC SLEEVE LAPAROSCOPIC, ESOPHAGOGASTRODUODENOSCOPY, HIATAL HERNIA REPAIR LAPAROSCOPIC  Progress Note    Dusty Calhoun  3/12/2019    Pre-op Diagnosis:   Obesity, Class III, BMI 40-49.9 (morbid obesity) (CMS/[facility]) [E66.01]       Post-Op Diagnosis Codes:     * Obesity, Class III, BMI 40-49.9 (morbid obesity) (CMS/[facility]) [E66.01]    Procedure/CPT® Codes:  PA LAP, VERNON RESTRICT PROC, LONGITUDINAL GASTRECTOMY [18313]  PA ESOPHAGOGASTRODUODENOSCOPY TRANSORAL DIAGNOSTIC [96692]  PA LAP,ESOPHAGOGAST FUNDOPLASTY [83438]    Procedure(s):  GASTRIC SLEEVE LAPAROSCOPIC  ESOPHAGOGASTRODUODENOSCOPY  HIATAL HERNIA REPAIR LAPAROSCOPIC    Surgeon(s):  Joselin Fish MD    Anesthesia: General with Block    Staff:   Circulator: hCanelle Arciniega RN  Scrub Person: Lory Ferrera; Vita Lee  Nursing Assistant: Krista Martinez; Danelle Waters; Gina Carolina    Estimated Blood Loss: none    Urine Voided: * No values recorded between 3/12/2019  2:35 PM and  3/12/2019  4:15 PM *    Specimens:                ID Type Source Tests Collected by Time   A : SUB-TOTAL GASTRECTOMY Tissue Stomach TISSUE PATHOLOGY EXAM Joselin Fish MD 3/12/2019 1500         Drains:      Findings: small hiatal hernia, umbilical hernia    Complications: none      Joselin Fish MD     Date: 3/12/2019  Time: 4:15 PM        Electronically signed by Joselin Fish MD at 3/12/2019  4:16 PM     Joselin Fish MD at 3/12/2019 11:23 AM  Version 1 of 1       OPERATIVE REPORT    DATE: 03/12/19    PATIENT: Dusty Calhoun    PREOPERATIVE DIAGNOSIS:    1. Obesity with multiple comorbidities  2.  Hiatal hernia     POSTOPERATIVE DIAGNOSIS:    1. Obesity with multiple comorbidities  2.  Hiatal hernia     PROCEDURES PERFORMED:  1. Laparoscopic sleeve gastrectomy (85% subtotal vertical gastrectomy) over a  36-Bermudian bougie dilator.   2.  Esophagogastroduodenoscopy  3.  Laparoscopic hiatal hernia repair     SURGEON:  Joselin Fish M.D.    ANESTHESIA:  General endotracheal with KRISTEN block    ESTIMATED BLOOD LOSS:   <25 mL    FLUIDS:  Crystalloids.    SPECIMENS:  Subtotal gastrectomy.    DRAINS:  None.    COUNTS:  Correct.    COMPLICATIONS:  None.    FINDINGS: small hiatal hernia, umbilical hernia    INDICATIONS:   Dusty Calhoun is a 38 y.o. year old female with morbid obesity and associated comorbidities who presents for elective laparoscopic sleeve gastrectomy. The patient has has undergone our extensive preoperative education, teaching, and consent process. Everything is in order and they wish to proceed.  Additionally, preoperative endoscopy indicated a hiatal hernia and this will be repaired today also.      DESCRIPTION OF PROCEDURE:   The patient was brought to the operating room and placed supine upon the operating room table. SCDs were placed. The patient underwent uneventful general endotracheal anesthesia and bilateral KRISTEN blocks per the anesthesiology staff.  She  received subcutaneous Lovenox and was given 2 grams of Ancef. The abdomen was prepped with ChloraPrep and draped in the usual sterile fashion. An Ioban was used as well.  Timeout was performed.       A small transverse incision was made a few centimeters above and to the left of the umbilicus and the peritoneal cavity entered under direct camera visualization using an 11 mm 0° laparoscope and an Optiview trocar. The abdomen was then insufflated to a pressure of 16 mmHg with CO2 gas. Exploratory laparoscopy revealed no evidence of injury from the entrance technique. The liver had a uniform capsule and was moderate in size without evidence of gross hepatomegaly or fibrosis.  The gallbladder was normal.  A 5 mm port was placed in the right mid abdomen laterally and a 15 mm port was placed just right of the umbilicus.   I noted an 2-3 cm umbilical hernia without contents.  An 11 mm port was placed lateral and to the left to the first port and a 5 mm port was placed a handsbreadth lateral to that on the left.  A Carola liver retractor was placed through a small subxiphoid stab incision and the the left lobe of the liver was elevated.       The stomach was decompressed with an orogastric tube, which was then withdrawn back into the esophagus. The greater curvature vessels were taken down with a Ligasure energy device beginning at the midpoint of the stomach and continued up to the angle of His, including the short gastrics. The left louis was completely exposed and there was no hiatal hernia here, but a small hernia seen anteriorly This was photodocumented. The GE junction fat pad was elevated to make a clear landing zone for the stapler later. The greater curvature vessels were taken down with the Ligasure extending distally within 3 cm of the pylorus. Filmy adhesions to the stomach were taken down posteriorly.    I incised the hernia sac from the left louis of the diaphragm.  I incised the phrenoesophageal ligament  with the Ligasure. The pars flaccida was opened (there was a replaced hepatic vessel) and the right louis was exposed.  I incised the hernia sac from the right louis.  An instrument was passed under the esophagus at the base of the hiatus and brought easily out the other side.  A penrose drain was placed around the esophagus for retraction.  The esophagus was mobilized into the abdomen 5 cm.  A posterior cruroplasty was performed with two 0 silk stitches.  The crura came together without tension and repair was photodocumented.  The penrose was removed.  The time to repair the hernia was documented and was 16 minutes.      The OGT was removed, and the 36 Thai bougie dilator was passed transorally and positioned along the lesser curvature of the stomach with the tip at the pylorus. Using the bougie as template, a sleeve gastrectomy was performed with an articulating Signia stapler bolstered by double tissue reinforcement. The first load was a 60mm black, the next load at the incisura was a 45 mm purple, and the next three loads were purple 60 mm, and the last load was a 45 mm purple.  The bougie was removed before the last staple load was fired. Care was taken to stay 1 cm away from the esophagus to prevent any esophagus fibers from being divided. The staple line was examined and was hemostatic.  The gastrectomy specimen was removed through the 15 mm port site in a specimen bag. The specimen was later examined, and the staples were well-formed. Palpation of the specimen revealed no masses. The staple line of the sleeve gastrectomy revealed staples that were well-formed.      The flexible endoscope was passed transorally down to the pylorus easily. The sleeve extended to within 2-3 cm proximal to the pylorus and was hemostatic. The sleeve was not narrow or twisted. There was no persistent hiatal hernia or distal esophagitis. The rest of the esophagus was normal. The scope was removed. The leak test was normal.    I  rescrubbed and suctioned the irrigation fluid from the upper abdomen, making sure it was dry. The staple line on the stomach was hemostatic.  A 2-0 Vicryl stitch was used to pexy the proximal staple line to the left louis of the diaphragm, and another 2-0 Vicryl stitch was used to pexy the mid body of the stomach to the cut edge of the omentum.  The staple line was treated with 4 ml of aerosolized Tisseel fibrin glue.  The liver retractor was removed easily.      The 15 mm port was removed under direct visualization and there was no bleeding. This incision was closed with an 0-vicryl transfascial suture using a suture passer under direct visualization in a horizontal mattress fashion. All other ports were removed and then replaced under direct visualization and all of these were hemostatic. The instruments were removed and the abdomen was desufflated. The ports were removed. A 2-0 vicryl was used to close the subcutaneous tissue in the 15 mm port site. 3-0 monocryl plus was used to close skin incisions with interrupted sutures. Skin Affix skin glue was placed. The patient was awakened and taken to recovery in good condition, having tolerated the procedure well. All sponge, needle, and instrument counts were correct.                  Electronically signed by Joselin Fish MD at 3/12/2019  4:18 PM

## 2019-03-13 NOTE — PLAN OF CARE
Problem: Bariatric Surgery (Adult,Pediatric)  Goal: Signs and Symptoms of Listed Potential Problems Will be Absent, Minimized or Managed (Bariatric Surgery)  Outcome: Ongoing (interventions implemented as appropriate)   03/13/19 8344   Goal/Outcome Evaluation   Problems Assessed (Bariatric Surgery) all   Problems Present (Bariatric Surgery) bowel motility decreased;pain;situational response;postoperative nausea and vomiting

## 2019-03-13 NOTE — PLAN OF CARE
Problem: Fall Risk (Adult)  Goal: Identify Related Risk Factors and Signs and Symptoms  Outcome: Ongoing (interventions implemented as appropriate)   03/13/19 6351   Fall Risk (Adult)   Related Risk Factors (Fall Risk) confusion/agitation;sensory deficits;environment unfamiliar   Signs and Symptoms (Fall Risk) presence of risk factors

## 2019-03-13 NOTE — PLAN OF CARE
Problem: Patient Care Overview  Goal: Plan of Care Review  Outcome: Ongoing (interventions implemented as appropriate)   03/13/19 1238   Coping/Psychosocial   Plan of Care Reviewed With patient   Plan of Care Review   Progress no change   OTHER   Outcome Summary patient is in pain and nausated. She is not taking in protein but is ambulating using her IS. VSS       Problem: Fall Risk (Adult)  Goal: Absence of Fall  Outcome: Outcome(s) achieved Date Met: 03/13/19      Problem: Bariatric Surgery (Adult,Pediatric)  Goal: Signs and Symptoms of Listed Potential Problems Will be Absent, Minimized or Managed (Bariatric Surgery)  Outcome: Ongoing (interventions implemented as appropriate)   03/13/19 1238   Goal/Outcome Evaluation   Problems Assessed (Bariatric Surgery) all   Problems Present (Bariatric Surgery) bowel motility decreased;situational response;respiratory compromise;postoperative nausea and vomiting;pain

## 2019-03-14 ENCOUNTER — APPOINTMENT (OUTPATIENT)
Dept: CARDIOLOGY | Facility: HOSPITAL | Age: 39
End: 2019-03-14

## 2019-03-14 LAB
ALBUMIN SERPL-MCNC: 4.32 G/DL (ref 3.2–4.8)
ALBUMIN/GLOB SERPL: 2 G/DL (ref 1.5–2.5)
ALP SERPL-CCNC: 62 U/L (ref 25–100)
ALT SERPL W P-5'-P-CCNC: 58 U/L (ref 7–40)
ANION GAP SERPL CALCULATED.3IONS-SCNC: 7 MMOL/L (ref 3–11)
AST SERPL-CCNC: 33 U/L (ref 0–33)
BASOPHILS # BLD AUTO: 0.01 10*3/MM3 (ref 0–0.2)
BASOPHILS NFR BLD AUTO: 0.1 % (ref 0–1)
BH CV ECHO MEAS - BSA(HAYCOCK): 2.3 M^2
BH CV ECHO MEAS - BSA: 2.1 M^2
BH CV ECHO MEAS - BZI_BMI: 42.2 KILOGRAMS/M^2
BH CV ECHO MEAS - BZI_METRIC_HEIGHT: 160 CM
BH CV ECHO MEAS - BZI_METRIC_WEIGHT: 108 KG
BH CV LOWER VASCULAR LEFT COMMON FEMORAL AUGMENT: NORMAL
BH CV LOWER VASCULAR LEFT COMMON FEMORAL COMPRESS: NORMAL
BH CV LOWER VASCULAR LEFT COMMON FEMORAL PHASIC: NORMAL
BH CV LOWER VASCULAR LEFT COMMON FEMORAL SPONT: NORMAL
BH CV LOWER VASCULAR LEFT DISTAL FEMORAL COMPRESS: NORMAL
BH CV LOWER VASCULAR LEFT GASTRONEMIUS COMPRESS: NORMAL
BH CV LOWER VASCULAR LEFT GREATER SAPH AK COMPRESS: NORMAL
BH CV LOWER VASCULAR LEFT GREATER SAPH BK COMPRESS: NORMAL
BH CV LOWER VASCULAR LEFT LESSER SAPH COMPRESS: NORMAL
BH CV LOWER VASCULAR LEFT MID FEMORAL AUGMENT: NORMAL
BH CV LOWER VASCULAR LEFT MID FEMORAL COMPRESS: NORMAL
BH CV LOWER VASCULAR LEFT MID FEMORAL PHASIC: NORMAL
BH CV LOWER VASCULAR LEFT MID FEMORAL SPONT: NORMAL
BH CV LOWER VASCULAR LEFT PERONEAL COMPRESS: NORMAL
BH CV LOWER VASCULAR LEFT POPLITEAL AUGMENT: NORMAL
BH CV LOWER VASCULAR LEFT POPLITEAL COMPRESS: NORMAL
BH CV LOWER VASCULAR LEFT POPLITEAL PHASIC: NORMAL
BH CV LOWER VASCULAR LEFT POPLITEAL SPONT: NORMAL
BH CV LOWER VASCULAR LEFT POSTERIOR TIBIAL COMPRESS: NORMAL
BH CV LOWER VASCULAR LEFT PROFUNDA FEMORAL AUGMENT: NORMAL
BH CV LOWER VASCULAR LEFT PROFUNDA FEMORAL COMPETENT: NORMAL
BH CV LOWER VASCULAR LEFT PROFUNDA FEMORAL COMPRESS: NORMAL
BH CV LOWER VASCULAR LEFT PROFUNDA FEMORAL PHASIC: NORMAL
BH CV LOWER VASCULAR LEFT PROFUNDA FEMORAL SPONT: NORMAL
BH CV LOWER VASCULAR LEFT PROXIMAL FEMORAL COMPRESS: NORMAL
BH CV LOWER VASCULAR LEFT SAPHENOFEMORAL JUNCTION AUGMENT: NORMAL
BH CV LOWER VASCULAR LEFT SAPHENOFEMORAL JUNCTION COMPRESS: NORMAL
BH CV LOWER VASCULAR LEFT SAPHENOFEMORAL JUNCTION PHASIC: NORMAL
BH CV LOWER VASCULAR LEFT SAPHENOFEMORAL JUNCTION SPONT: NORMAL
BH CV LOWER VASCULAR RIGHT COMMON FEMORAL AUGMENT: NORMAL
BH CV LOWER VASCULAR RIGHT COMMON FEMORAL COMPRESS: NORMAL
BH CV LOWER VASCULAR RIGHT COMMON FEMORAL PHASIC: NORMAL
BH CV LOWER VASCULAR RIGHT COMMON FEMORAL SPONT: NORMAL
BH CV LOWER VASCULAR RIGHT DISTAL FEMORAL COMPRESS: NORMAL
BH CV LOWER VASCULAR RIGHT GASTRONEMIUS COMPRESS: NORMAL
BH CV LOWER VASCULAR RIGHT GREATER SAPH AK COMPRESS: NORMAL
BH CV LOWER VASCULAR RIGHT GREATER SAPH BK COMPRESS: NORMAL
BH CV LOWER VASCULAR RIGHT LESSER SAPH COMPRESS: NORMAL
BH CV LOWER VASCULAR RIGHT MID FEMORAL AUGMENT: NORMAL
BH CV LOWER VASCULAR RIGHT MID FEMORAL COMPRESS: NORMAL
BH CV LOWER VASCULAR RIGHT MID FEMORAL PHASIC: NORMAL
BH CV LOWER VASCULAR RIGHT MID FEMORAL SPONT: NORMAL
BH CV LOWER VASCULAR RIGHT PERONEAL COMPRESS: NORMAL
BH CV LOWER VASCULAR RIGHT POPLITEAL AUGMENT: NORMAL
BH CV LOWER VASCULAR RIGHT POPLITEAL COMPRESS: NORMAL
BH CV LOWER VASCULAR RIGHT POPLITEAL PHASIC: NORMAL
BH CV LOWER VASCULAR RIGHT POPLITEAL SPONT: NORMAL
BH CV LOWER VASCULAR RIGHT POSTERIOR TIBIAL COMPRESS: NORMAL
BH CV LOWER VASCULAR RIGHT PROFUNDA FEMORAL COMPRESS: NORMAL
BH CV LOWER VASCULAR RIGHT PROXIMAL FEMORAL COMPRESS: NORMAL
BH CV LOWER VASCULAR RIGHT SAPHENOFEMORAL JUNCTION AUGMENT: NORMAL
BH CV LOWER VASCULAR RIGHT SAPHENOFEMORAL JUNCTION COMPRESS: NORMAL
BH CV LOWER VASCULAR RIGHT SAPHENOFEMORAL JUNCTION PHASIC: NORMAL
BH CV LOWER VASCULAR RIGHT SAPHENOFEMORAL JUNCTION SPONT: NORMAL
BILIRUB SERPL-MCNC: 0.7 MG/DL (ref 0.3–1.2)
BUN BLD-MCNC: 8 MG/DL (ref 9–23)
BUN/CREAT SERPL: 8.6 (ref 7–25)
CALCIUM SPEC-SCNC: 8.5 MG/DL (ref 8.7–10.4)
CHLORIDE SERPL-SCNC: 107 MMOL/L (ref 99–109)
CO2 SERPL-SCNC: 25 MMOL/L (ref 20–31)
CREAT BLD-MCNC: 0.93 MG/DL (ref 0.6–1.3)
DEPRECATED RDW RBC AUTO: 44.4 FL (ref 37–54)
EOSINOPHIL # BLD AUTO: 0.02 10*3/MM3 (ref 0–0.3)
EOSINOPHIL NFR BLD AUTO: 0.1 % (ref 0–3)
ERYTHROCYTE [DISTWIDTH] IN BLOOD BY AUTOMATED COUNT: 13.6 % (ref 11.3–14.5)
GFR SERPL CREATININE-BSD FRML MDRD: 68 ML/MIN/1.73
GLOBULIN UR ELPH-MCNC: 2.2 GM/DL
GLUCOSE BLD-MCNC: 108 MG/DL (ref 70–100)
HCT VFR BLD AUTO: 40.2 % (ref 34.5–44)
HGB BLD-MCNC: 13.1 G/DL (ref 11.5–15.5)
IMM GRANULOCYTES # BLD AUTO: 0.05 10*3/MM3 (ref 0–0.05)
IMM GRANULOCYTES NFR BLD AUTO: 0.4 % (ref 0–0.6)
LYMPHOCYTES # BLD AUTO: 2.75 10*3/MM3 (ref 0.6–4.8)
LYMPHOCYTES NFR BLD AUTO: 20.2 % (ref 24–44)
MCH RBC QN AUTO: 29.1 PG (ref 27–31)
MCHC RBC AUTO-ENTMCNC: 32.6 G/DL (ref 32–36)
MCV RBC AUTO: 89.3 FL (ref 80–99)
MONOCYTES # BLD AUTO: 1.15 10*3/MM3 (ref 0–1)
MONOCYTES NFR BLD AUTO: 8.4 % (ref 0–12)
NEUTROPHILS # BLD AUTO: 9.69 10*3/MM3 (ref 1.5–8.3)
NEUTROPHILS NFR BLD AUTO: 71.2 % (ref 41–71)
PLATELET # BLD AUTO: 345 10*3/MM3 (ref 150–450)
PMV BLD AUTO: 9.1 FL (ref 6–12)
POTASSIUM BLD-SCNC: 4.1 MMOL/L (ref 3.5–5.5)
PROT SERPL-MCNC: 6.5 G/DL (ref 5.7–8.2)
RBC # BLD AUTO: 4.5 10*6/MM3 (ref 3.89–5.14)
SODIUM BLD-SCNC: 139 MMOL/L (ref 132–146)
WBC NRBC COR # BLD: 13.62 10*3/MM3 (ref 3.5–10.8)

## 2019-03-14 PROCEDURE — 93970 EXTREMITY STUDY: CPT

## 2019-03-14 PROCEDURE — 63710000001 PROMETHAZINE PER 25 MG: Performed by: SURGERY

## 2019-03-14 PROCEDURE — 93970 EXTREMITY STUDY: CPT | Performed by: INTERNAL MEDICINE

## 2019-03-14 PROCEDURE — 99024 POSTOP FOLLOW-UP VISIT: CPT | Performed by: SURGERY

## 2019-03-14 PROCEDURE — 85025 COMPLETE CBC W/AUTO DIFF WBC: CPT | Performed by: SURGERY

## 2019-03-14 PROCEDURE — 80053 COMPREHEN METABOLIC PANEL: CPT | Performed by: SURGERY

## 2019-03-14 PROCEDURE — 25010000002 ENOXAPARIN PER 10 MG: Performed by: SURGERY

## 2019-03-14 RX ADMIN — POTASSIUM CHLORIDE AND SODIUM CHLORIDE 100 ML/HR: 450; 150 INJECTION, SOLUTION INTRAVENOUS at 14:14

## 2019-03-14 RX ADMIN — HYDROCODONE BITARTRATE AND ACETAMINOPHEN 1 TABLET: 7.5; 325 TABLET ORAL at 14:22

## 2019-03-14 RX ADMIN — SIMETHICONE CHEW TAB 80 MG 80 MG: 80 TABLET ORAL at 16:37

## 2019-03-14 RX ADMIN — SODIUM CHLORIDE, POTASSIUM CHLORIDE, SODIUM LACTATE AND CALCIUM CHLORIDE 1000 ML: 600; 310; 30; 20 INJECTION, SOLUTION INTRAVENOUS at 12:09

## 2019-03-14 RX ADMIN — ONDANSETRON HYDROCHLORIDE 4 MG: 4 TABLET, FILM COATED ORAL at 09:11

## 2019-03-14 RX ADMIN — HYDROCODONE BITARTRATE AND ACETAMINOPHEN 1 TABLET: 7.5; 325 TABLET ORAL at 18:58

## 2019-03-14 RX ADMIN — HYDROCODONE BITARTRATE AND ACETAMINOPHEN 1 TABLET: 7.5; 325 TABLET ORAL at 09:11

## 2019-03-14 RX ADMIN — LISINOPRIL 10 MG: 10 TABLET ORAL at 09:11

## 2019-03-14 RX ADMIN — PROMETHAZINE HYDROCHLORIDE 12.5 MG: 25 TABLET ORAL at 14:27

## 2019-03-14 RX ADMIN — ONDANSETRON HYDROCHLORIDE 4 MG: 4 TABLET, FILM COATED ORAL at 18:58

## 2019-03-14 RX ADMIN — LEVOTHYROXINE SODIUM 50 MCG: 50 TABLET ORAL at 06:28

## 2019-03-14 RX ADMIN — PANTOPRAZOLE SODIUM 40 MG: 40 TABLET, DELAYED RELEASE ORAL at 06:28

## 2019-03-14 RX ADMIN — ENOXAPARIN SODIUM 40 MG: 40 INJECTION SUBCUTANEOUS at 09:11

## 2019-03-14 NOTE — PROGRESS NOTES
"Cc: POD#2  LSG/HHR abd pain    Her mother is in the room.  The patient complains of postprandial abdominal pain, cramping and then radiation into her chest and shoulders with regurgitation, nausea and poor oral intake.  She just passed a small amount of flatus.  She is ambulating and voiding \"all the time\".  No pulmonary complaints.  She can get her spirometer as high as 1500 but says sometimes she can only get it to 1000.  When she walks long distances she gets worn out and her legs swell and turn red.  No fever pulse 96 respirations 16 blood pressure 118/76  UO 3600 she is in no apparent distress abdomen is soft, nontender, nondistended, bowel sounds are active.  Wounds look okay  CMP normal except for glucose of 108 BUN of 8 calcium of 8.5 SGPT 58 white blood count is 13.6 with 71 segs 20 lymphs 8 monocytes H&H is 13.1 and 40.2  Duplex venous lower extremity bilateral exam negative    Impression: Postoperative day #1 sleeve gastrectomy and concomitant hiatal hernia repair.  Postprandial discomfort as above and poor oral intake.  Upper GI showed no delay in gastric emptying and moderate esophageal dysmotility.  Lower extremity edema and redness after walking negative duplex.  Poor incentive spirometry remote history of tobacco use mild leukocytosis.    Plan: Continue liquids, out of bed, pulmonary toilet, VTE proph.  See orders.   "

## 2019-03-14 NOTE — PLAN OF CARE
Problem: Patient Care Overview  Goal: Individualization and Mutuality  Outcome: Ongoing (interventions implemented as appropriate)    Goal: Discharge Needs Assessment  Outcome: Ongoing (interventions implemented as appropriate)      Problem: Bariatric Surgery (Adult,Pediatric)  Goal: Anesthesia/Sedation Recovery  Outcome: Ongoing (interventions implemented as appropriate)      Problem: Pain, Acute (Adult)  Goal: Acceptable Pain Control/Comfort Level  Outcome: Ongoing (interventions implemented as appropriate)

## 2019-03-14 NOTE — PLAN OF CARE
Problem: Patient Care Overview  Goal: Plan of Care Review  Outcome: Ongoing (interventions implemented as appropriate)   03/14/19 1519   Coping/Psychosocial   Plan of Care Reviewed With patient   Plan of Care Review   Progress no change   OTHER   Outcome Summary patients using her IS ambulating and taking in protein       Problem: Bariatric Surgery (Adult,Pediatric)  Goal: Signs and Symptoms of Listed Potential Problems Will be Absent, Minimized or Managed (Bariatric Surgery)  Outcome: Ongoing (interventions implemented as appropriate)   03/14/19 4839   Goal/Outcome Evaluation   Problems Assessed (Bariatric Surgery) all   Problems Present (Bariatric Surgery) postoperative nausea and vomiting       Problem: Pain, Acute (Adult)  Goal: Identify Related Risk Factors and Signs and Symptoms  Outcome: Ongoing (interventions implemented as appropriate)   03/14/19 1519   Pain, Acute (Adult)   Related Risk Factors (Acute Pain) surgery;patient perception   Signs and Symptoms (Acute Pain) verbalization of pain descriptors

## 2019-03-14 NOTE — PROGRESS NOTES
Continued Stay Note   [place]     Patient Name: Dusty Calhoun  MRN: [id number]  Today's Date: 3/14/2019    Admit Date: 3/12/2019    Discharge Plan     Row Name 03/14/19 1006       Plan    Plan  Plan is home with family at discharge    Patient/Family in Agreement with Plan  yes    Plan Comments  Met with patient at bedside.  Improving.  No discharge needs at present.    Final Discharge Disposition Code  01 - home or self-care        Discharge Codes    No documentation.             Monae Lin RN

## 2019-03-15 VITALS
WEIGHT: 238.5 LBS | DIASTOLIC BLOOD PRESSURE: 101 MMHG | HEART RATE: 92 BPM | RESPIRATION RATE: 16 BRPM | HEIGHT: 64 IN | SYSTOLIC BLOOD PRESSURE: 135 MMHG | TEMPERATURE: 98.6 F | BODY MASS INDEX: 40.72 KG/M2 | OXYGEN SATURATION: 98 %

## 2019-03-15 LAB
ALBUMIN SERPL-MCNC: 3.85 G/DL (ref 3.2–4.8)
ALBUMIN/GLOB SERPL: 1.9 G/DL (ref 1.5–2.5)
ALP SERPL-CCNC: 59 U/L (ref 25–100)
ALT SERPL W P-5'-P-CCNC: 51 U/L (ref 7–40)
ANION GAP SERPL CALCULATED.3IONS-SCNC: 9 MMOL/L (ref 3–11)
AST SERPL-CCNC: 24 U/L (ref 0–33)
BASOPHILS # BLD AUTO: 0.02 10*3/MM3 (ref 0–0.2)
BASOPHILS NFR BLD AUTO: 0.2 % (ref 0–1)
BILIRUB SERPL-MCNC: 0.9 MG/DL (ref 0.3–1.2)
BUN BLD-MCNC: 8 MG/DL (ref 9–23)
BUN/CREAT SERPL: 9.4 (ref 7–25)
CALCIUM SPEC-SCNC: 8.4 MG/DL (ref 8.7–10.4)
CHLORIDE SERPL-SCNC: 104 MMOL/L (ref 99–109)
CO2 SERPL-SCNC: 23 MMOL/L (ref 20–31)
CREAT BLD-MCNC: 0.85 MG/DL (ref 0.6–1.3)
CYTO UR: NORMAL
DEPRECATED RDW RBC AUTO: 43.2 FL (ref 37–54)
EOSINOPHIL # BLD AUTO: 0.1 10*3/MM3 (ref 0–0.3)
EOSINOPHIL NFR BLD AUTO: 0.9 % (ref 0–3)
ERYTHROCYTE [DISTWIDTH] IN BLOOD BY AUTOMATED COUNT: 13.4 % (ref 11.3–14.5)
GFR SERPL CREATININE-BSD FRML MDRD: 76 ML/MIN/1.73
GLOBULIN UR ELPH-MCNC: 2.1 GM/DL
GLUCOSE BLD-MCNC: 84 MG/DL (ref 70–100)
HCT VFR BLD AUTO: 36.9 % (ref 34.5–44)
HGB BLD-MCNC: 12.6 G/DL (ref 11.5–15.5)
IMM GRANULOCYTES # BLD AUTO: 0.03 10*3/MM3 (ref 0–0.05)
IMM GRANULOCYTES NFR BLD AUTO: 0.3 % (ref 0–0.6)
LAB AP CASE REPORT: NORMAL
LAB AP CLINICAL INFORMATION: NORMAL
LYMPHOCYTES # BLD AUTO: 3.64 10*3/MM3 (ref 0.6–4.8)
LYMPHOCYTES NFR BLD AUTO: 33.3 % (ref 24–44)
MCH RBC QN AUTO: 30.1 PG (ref 27–31)
MCHC RBC AUTO-ENTMCNC: 34.1 G/DL (ref 32–36)
MCV RBC AUTO: 88.1 FL (ref 80–99)
MONOCYTES # BLD AUTO: 0.83 10*3/MM3 (ref 0–1)
MONOCYTES NFR BLD AUTO: 7.6 % (ref 0–12)
NEUTROPHILS # BLD AUTO: 6.31 10*3/MM3 (ref 1.5–8.3)
NEUTROPHILS NFR BLD AUTO: 57.7 % (ref 41–71)
PATH REPORT.FINAL DX SPEC: NORMAL
PATH REPORT.GROSS SPEC: NORMAL
PLATELET # BLD AUTO: 291 10*3/MM3 (ref 150–450)
PMV BLD AUTO: 8.5 FL (ref 6–12)
POTASSIUM BLD-SCNC: 4.1 MMOL/L (ref 3.5–5.5)
PROT SERPL-MCNC: 5.9 G/DL (ref 5.7–8.2)
RBC # BLD AUTO: 4.19 10*6/MM3 (ref 3.89–5.14)
SODIUM BLD-SCNC: 136 MMOL/L (ref 132–146)
WBC NRBC COR # BLD: 10.93 10*3/MM3 (ref 3.5–10.8)

## 2019-03-15 PROCEDURE — 25010000002 ENOXAPARIN PER 10 MG: Performed by: SURGERY

## 2019-03-15 PROCEDURE — 63710000001 PROMETHAZINE PER 25 MG: Performed by: SURGERY

## 2019-03-15 PROCEDURE — 85025 COMPLETE CBC W/AUTO DIFF WBC: CPT | Performed by: SURGERY

## 2019-03-15 PROCEDURE — 99024 POSTOP FOLLOW-UP VISIT: CPT | Performed by: SURGERY

## 2019-03-15 PROCEDURE — 80053 COMPREHEN METABOLIC PANEL: CPT | Performed by: SURGERY

## 2019-03-15 RX ORDER — ONDANSETRON 4 MG/1
4 TABLET, FILM COATED ORAL EVERY 4 HOURS PRN
Qty: 20 TABLET | Refills: 0 | Status: SHIPPED | OUTPATIENT
Start: 2019-03-15 | End: 2019-03-22

## 2019-03-15 RX ORDER — OMEPRAZOLE 40 MG/1
40 CAPSULE, DELAYED RELEASE ORAL DAILY
Qty: 60 CAPSULE | Refills: 1 | Status: SHIPPED | OUTPATIENT
Start: 2019-03-15 | End: 2019-05-14

## 2019-03-15 RX ORDER — PROMETHAZINE HYDROCHLORIDE 12.5 MG/1
12.5 TABLET ORAL EVERY 4 HOURS PRN
Qty: 20 TABLET | Refills: 0 | Status: SHIPPED | OUTPATIENT
Start: 2019-03-15 | End: 2019-03-22

## 2019-03-15 RX ORDER — HYDROCODONE BITARTRATE AND ACETAMINOPHEN 7.5; 325 MG/1; MG/1
1 TABLET ORAL EVERY 4 HOURS PRN
Qty: 20 TABLET | Refills: 0 | Status: SHIPPED | OUTPATIENT
Start: 2019-03-15 | End: 2019-03-25

## 2019-03-15 RX ADMIN — POTASSIUM CHLORIDE AND SODIUM CHLORIDE 100 ML/HR: 450; 150 INJECTION, SOLUTION INTRAVENOUS at 10:45

## 2019-03-15 RX ADMIN — HYDROMORPHONE HYDROCHLORIDE 2 MG: 2 TABLET ORAL at 10:45

## 2019-03-15 RX ADMIN — HYDROCODONE BITARTRATE AND ACETAMINOPHEN 1 TABLET: 7.5; 325 TABLET ORAL at 04:24

## 2019-03-15 RX ADMIN — LISINOPRIL 10 MG: 10 TABLET ORAL at 08:24

## 2019-03-15 RX ADMIN — POTASSIUM CHLORIDE AND SODIUM CHLORIDE 100 ML/HR: 450; 150 INJECTION, SOLUTION INTRAVENOUS at 00:55

## 2019-03-15 RX ADMIN — SODIUM CHLORIDE, PRESERVATIVE FREE 3 ML: 5 INJECTION INTRAVENOUS at 08:24

## 2019-03-15 RX ADMIN — PROMETHAZINE HYDROCHLORIDE 12.5 MG: 25 TABLET ORAL at 12:56

## 2019-03-15 RX ADMIN — LEVOTHYROXINE SODIUM 50 MCG: 50 TABLET ORAL at 05:09

## 2019-03-15 RX ADMIN — PANTOPRAZOLE SODIUM 40 MG: 40 TABLET, DELAYED RELEASE ORAL at 05:09

## 2019-03-15 RX ADMIN — HYDROCODONE BITARTRATE AND ACETAMINOPHEN 1 TABLET: 7.5; 325 TABLET ORAL at 12:56

## 2019-03-15 RX ADMIN — ONDANSETRON HYDROCHLORIDE 4 MG: 4 TABLET, FILM COATED ORAL at 04:24

## 2019-03-15 RX ADMIN — ENOXAPARIN SODIUM 40 MG: 40 INJECTION SUBCUTANEOUS at 08:24

## 2019-03-15 RX ADMIN — ONDANSETRON HYDROCHLORIDE 4 MG: 4 TABLET, FILM COATED ORAL at 10:45

## 2019-03-15 NOTE — PROGRESS NOTES
Cc: POD#3 LSG/HHR  abd pain improved somewhat    The patient's mother is in the room.  She still has some of the same symptoms but improved and she is keeping down liquids and wants to go home.  She is passing flatus no bowel movement.  She is ambulating and voiding well.  No pulmonary complaints.  No fever pulse 92 respirations 16 blood pressure 135/101  UO 1650 she is in no apparent distress abdomen is soft, nontender, nondistended, bowel sounds are active.  Wounds look okay  CMP normal except for BUN of 8 calcium of 8.4 SGPT 51 white blood count 10.9 with a normal differential H&H 12.6 and 36.9    Impression: Doing okay.  Improving symptoms and tolerating p.o.  Has met discharge criteria.    Plan: Discharge home.  Discharge instructions were discussed.  See orders

## 2019-03-15 NOTE — PLAN OF CARE
Problem: Patient Care Overview  Goal: Plan of Care Review  Outcome: Ongoing (interventions implemented as appropriate)   03/15/19 0319   Coping/Psychosocial   Plan of Care Reviewed With patient;mother   Plan of Care Review   Progress no change     Goal: Individualization and Mutuality  Outcome: Ongoing (interventions implemented as appropriate)   03/14/19 0429   Individualization   Patient Specific Interventions assess pain q2 and as needed       Problem: Fall Risk (Adult)  Goal: Identify Related Risk Factors and Signs and Symptoms  Outcome: Ongoing (interventions implemented as appropriate)   03/15/19 0319   Fall Risk (Adult)   Related Risk Factors (Fall Risk) environment unfamiliar;sensory deficits   Signs and Symptoms (Fall Risk) presence of risk factors       Problem: Bariatric Surgery (Adult,Pediatric)  Goal: Signs and Symptoms of Listed Potential Problems Will be Absent, Minimized or Managed (Bariatric Surgery)  Outcome: Ongoing (interventions implemented as appropriate)   03/15/19 0319   Goal/Outcome Evaluation   Problems Assessed (Bariatric Surgery) all   Problems Present (Bariatric Surgery) postoperative nausea and vomiting       Problem: Pain, Acute (Adult)  Goal: Identify Related Risk Factors and Signs and Symptoms  Outcome: Ongoing (interventions implemented as appropriate)   03/15/19 0319   Pain, Acute (Adult)   Related Risk Factors (Acute Pain) surgery;patient perception   Signs and Symptoms (Acute Pain) verbalization of pain descriptors

## 2019-03-15 NOTE — PROGRESS NOTES
Continued Stay Note   [facility]     Patient Name: Dusty Calhoun  MRN: [id number]  Today's Date: 3/15/2019    Admit Date: 3/12/2019    Discharge Plan     Row Name 03/15/19 1040       Plan    Plan  Plan is home with family at discharge    Patient/Family in Agreement with Plan  yes    Plan Comments  Patient resting in bed at CM visit.  Feeling better.  No discharge needs at present.     Final Discharge Disposition Code  01 - home or self-care        Discharge Codes    No documentation.             Monae Lin RN

## 2019-03-15 NOTE — PLAN OF CARE
Problem: Patient Care Overview  Goal: Plan of Care Review  Outcome: Ongoing (interventions implemented as appropriate)   03/15/19 0924   Coping/Psychosocial   Plan of Care Reviewed With patient   Plan of Care Review   Progress no change   OTHER   Outcome Summary Has not took in protein or ambulated this am. family in room.     Goal: Individualization and Mutuality  Outcome: Ongoing (interventions implemented as appropriate)   03/14/19 0429   Individualization   Patient Specific Interventions assess pain q2 and as needed       Problem: Bariatric Surgery (Adult,Pediatric)  Goal: Signs and Symptoms of Listed Potential Problems Will be Absent, Minimized or Managed (Bariatric Surgery)  Outcome: Ongoing (interventions implemented as appropriate)   03/15/19 0924   Goal/Outcome Evaluation   Problems Assessed (Bariatric Surgery) all   Problems Present (Bariatric Surgery) bowel motility decreased;pain

## 2019-03-16 ENCOUNTER — READMISSION MANAGEMENT (OUTPATIENT)
Dept: CALL CENTER | Facility: HOSPITAL | Age: 39
End: 2019-03-16

## 2019-03-17 NOTE — OUTREACH NOTE
Prep Survey      Responses   Facility patient discharged from?  [place]   Is patient eligible?  Yes   Discharge diagnosis  Laparoscopic sleeve gastrectomy    Does the patient have one of the following disease processes/diagnoses(primary or secondary)?  General Surgery   Does the patient have Home health ordered?  No   Is there a DME ordered?  No   Prep survey completed?  Yes          Gisell Wilson RN

## 2019-03-18 ENCOUNTER — READMISSION MANAGEMENT (OUTPATIENT)
Dept: CALL CENTER | Facility: HOSPITAL | Age: 39
End: 2019-03-18

## 2019-03-18 NOTE — OUTREACH NOTE
General Surgery Week 1 Survey      Responses   Facility patient discharged from?  [place]   Does the patient have one of the following disease processes/diagnoses(primary or secondary)?  General Surgery   Is there a successful TCM telephone encounter documented?  No   Week 1 attempt successful?  No   Unsuccessful attempts  Attempt 1          Henrietta Conn, RN

## 2019-03-18 NOTE — PAYOR COMM NOTE
"Discharge date 3/15/19    auth # [id number]    Thank You,  Hansa Becerra, RN  Utilization Review  [phone]  Fax [phone]    Dusty Mayfield (38 y.o. Female)     Date of Birth Social Security Number Address Home Phone MRN    1980  [address]  [facility] [address] [phone] [id number]    Yarsanism Marital Status          [facility] Single       Admission Date Admission Type Admitting Provider Attending Provider Department, Room/Bed    3/12/19 Elective Joselin Olivas MD  [address], [address]/1    Discharge Date Discharge Disposition Discharge Destination        3/15/2019 Home or Self Care              Attending Provider:  (none)   Allergies:  No Known Allergies    Isolation:  None   Infection:  None   Code Status:  Prior    Ht:  161.3 cm (63.5\")   Wt:  108 kg (238 lb 8 oz)    Admission Cmt:  None   Principal Problem:  Obesity, Class III, BMI 40-49.9 (morbid obesity) (CMS/HCC) [E66.01] More...                 Active Insurance as of 3/12/2019     Primary Coverage     Payor Plan Insurance Group Employer/Plan Group    [employer] [place] [employer] [place]ICA[place]      Payor Plan Address Payor Plan Phone Number Payor Plan Fax Number Effective Dates    [address] [address] [phone]  4/6/2018 - None Entered    [facility] [id number]       Subscriber Name Subscriber Birth Date Member ID       DUSTY MAYFIELD 1980 [id number]                 Emergency Contacts      (Rel.) Home Phone Work Phone Mobile Phone    Alyssa Mayfield (Mother) [phone] -- --    Virgen Bains (Friend) -- -- [phone]            Discharge Summary     No notes of this type exist for this encounter.        Discharge Order (From admission, onward)    Start     Ordered    03/15/19 1611  Discharge patient  Once     Expected Discharge Date:  03/15/19    Discharge Disposition:  Home or Self Care    Physician of Record for Attribution - Please select from Treatment Team:  JOSELIN OLIVAS [[id number]]  "   Review needed by CMO to determine Physician of Record:  No       Question Answer Comment   Physician of Record for Attribution - Please select from Treatment Team SASKIA OLIVAS    Review needed by CMO to determine Physician of Record No        03/15/19 9651

## 2019-03-18 NOTE — PROGRESS NOTES
Case Management Discharge Note    Final Note: Plan is home     Destination      No service has been selected for the patient.      Durable Medical Equipment      No service has been selected for the patient.      Dialysis/Infusion      No service has been selected for the patient.      Home Medical Care      No service has been selected for the patient.      Community Resources      No service has been selected for the patient.             Final Discharge Disposition Code: 01 - home or self-care

## 2019-03-19 ENCOUNTER — READMISSION MANAGEMENT (OUTPATIENT)
Dept: CALL CENTER | Facility: HOSPITAL | Age: 39
End: 2019-03-19

## 2019-03-19 ENCOUNTER — OFFICE VISIT (OUTPATIENT)
Dept: BARIATRICS/WEIGHT MGMT | Facility: CLINIC | Age: 39
End: 2019-03-19

## 2019-03-19 VITALS
TEMPERATURE: 97.4 F | WEIGHT: 222.5 LBS | OXYGEN SATURATION: 99 % | BODY MASS INDEX: 37.98 KG/M2 | SYSTOLIC BLOOD PRESSURE: 118 MMHG | HEIGHT: 64 IN | DIASTOLIC BLOOD PRESSURE: 78 MMHG | HEART RATE: 115 BPM | RESPIRATION RATE: 18 BRPM

## 2019-03-19 DIAGNOSIS — Z98.84 S/P BARIATRIC SURGERY: Primary | ICD-10-CM

## 2019-03-19 PROBLEM — E66.01 OBESITY, CLASS III, BMI 40-49.9 (MORBID OBESITY) (HCC): Status: RESOLVED | Noted: 2019-02-27 | Resolved: 2019-03-19

## 2019-03-19 PROCEDURE — 99024 POSTOP FOLLOW-UP VISIT: CPT | Performed by: PHYSICIAN ASSISTANT

## 2019-03-19 NOTE — PROGRESS NOTES
"[facility] Bariatric Surgery  [address]  [facility] [address] [phone]      Patient Name:  Dusty Calhoun.  :  1980      Date of Visit: 2019      Reason for Visit:  POD #7    HPI:  Dusty Calhoun is a 38 y.o. female s/p LSG/HHR w/ Dr. Fish on 3/12/19    Discharged on POD#3 - c/o postprandial abdominal pain, cramping and then radiation into her chest and shoulders with regurgitation, nausea and poor oral intake.     Says \"doing good\", but getting only 30-40g prot/day b/c \"it hurts\" when it hits her stomach w/ pressure and gurgling in her chest.  Reportedly \"getting better each day\", but just not getting down much more.  Not drinking any water b/c she just does not want to and thought that getting protein was more important.  Does c/o constant nausea, but no vomiting, no reflux, and no abd.pain.  Bowels have not moved.  Voiding 2-3x/day.  Using Zofran/Phenergan which helps \"some\".  Taking Omeprazole daily.  Does not have a gallbladder.  Doing MVI patch + PO Vit D and Vit B12.  Says waiting on her iron patch to arrive.  Has not yet been able to find Vit B1.      Ambulating frequently, taking care of 3 kids.  Accompanied by mom in office today.  Noted  on triage.     Presurgery weight: 238 pounds.  Today's weight is 101 kg (222 lb 8 oz) pounds, today's  Body mass index is 38.8 kg/m²., and her weight loss since surgery is 16 pounds.         Final Diagnosis   STOMACH, SUBTOTAL GASTRECTOMY:  No significant histopathologic abnormalities, gastric mucosa and muscularis.   Organisms morphologically compatible with H.pylori absent on routine stains     Past Medical History:   Diagnosis Date   • Abnormal ECG     followed by cardiology annually, negative workup up per patient.    • Abnormal glucose     Was on metformin for DM, d/c soda and A1C normalized.    • Anxiety    • Boil     h/o mulitple boils, never had I&D, no known MRSA   • Constipation     metaucil prn  "   • Depression    • Dyspepsia    • Fatigue    • Fatty liver     noted on US, confirmed during lap jessika (no Biopsy)   • Former smoker    • Hashimoto's disease     last labs showed euthyroid without meds   • Heartburn     rare, doesn't take meds typically.  No h/o EGD or h pylori.    • History of diabetes mellitus     at one point needed Metformin until stopped drinking sugary sodas in 2017   • Hypertension    • Morbid obesity with BMI of 40.0-44.9, adult (CMS/HCC)    • Nocturnal oxygen desaturation     uses 2 liters of oxygen at night    • Pain in shin    • PONV (postoperative nausea and vomiting)     nasuea only but never vomited    • Shortness of breath    • Suspected sleep apnea     Has home oxygen testing, was on ngihttime oxygen in the past, has yet to have sleep study.    • Thyroid goiter     Followed by US annually, no procedures to date.      Past Surgical History:   Procedure Laterality Date   • ENDOSCOPY     • ENDOSCOPY N/A 3/12/2019    Procedure: ESOPHAGOGASTRODUODENOSCOPY;  Surgeon: Joselin Fish MD;  Location:  [facility];  Service: Bariatric   • GASTRIC SLEEVE LAPAROSCOPIC N/A 3/12/2019    Procedure: GASTRIC SLEEVE LAPAROSCOPIC;  Surgeon: Joselin Fish MD;  Location:  [facility];  Service: Bariatric   • HIATAL HERNIA REPAIR N/A 3/12/2019    Procedure: HIATAL HERNIA REPAIR LAPAROSCOPIC;  Surgeon: Joselin Fish MD;  Location:  [facility];  Service: Bariatric   • LAPAROSCOPIC CHOLECYSTECTOMY  2014    cholelithiasis   • TUBAL ABDOMINAL LIGATION  2011    laparoscopic     Outpatient Medications Marked as Taking for the 3/19/19 encounter (Office Visit) with Fátima Tsang PA   Medication Sig Dispense Refill   • Cholecalciferol (VITAMIN D3) 5000 units capsule capsule Take 5,000 Units by mouth Daily.     • Cyanocobalamin (VITAMIN B-12) 500 MCG sublingual tablet Place 500 mcg under the tongue 3 (Three) Times a Day.     • HYDROcodone-acetaminophen (NORCO) 7.5-325 MG per tablet Take 1  tablet by mouth Every 4 (Four) Hours As Needed for Moderate Pain  for up to 10 days. 20 tablet 0   • levothyroxine (SYNTHROID, LEVOTHROID) 50 MCG tablet Take 50 mcg by mouth Daily.     • lisinopril (PRINIVIL,ZESTRIL) 10 MG tablet Take 10 mg by mouth Daily.     • Multiple Vitamins-Minerals (MULTIVITAMIN ADULT PO) Take  by mouth.     • omeprazole (PRILOSEC) 40 MG capsule Take 1 capsule by mouth Daily for 60 days. 60 capsule 1   • ondansetron (ZOFRAN) 4 MG tablet Take 1 tablet by mouth Every 4 (Four) Hours As Needed for Nausea. 20 tablet 0   • promethazine (PHENERGAN) 12.5 MG tablet Take 1 tablet by mouth Every 4 (Four) Hours As Needed for Nausea. 20 tablet 0     No Known Allergies    Social History     Socioeconomic History   • Marital status: Single     Spouse name: Not on file   • Number of children: 3   • Years of education: High School    • Highest education level: Not on file   Social Needs   • Financial resource strain: Not on file   • Food insecurity - worry: Not on file   • Food insecurity - inability: Not on file   • Transportation needs - medical: Not on file   • Transportation needs - non-medical: Not on file   Occupational History   • Occupation: Student   Tobacco Use   • Smoking status: Former Smoker     Packs/day: 2.00     Years: 15.00     Pack years: 30.00     Types: Cigarettes     Last attempt to quit:      Years since quittin.2   • Smokeless tobacco: Never Used   • Tobacco comment: no second hand smoke in the home    Substance and Sexual Activity   • Alcohol use: No   • Drug use: No   • Sexual activity: Defer     Birth control/protection: Surgical     Comment: no hormones   Other Topics Concern   • Not on file   Social History Narrative    Lives in [facility] with 3 children.  Not working, in school for associates in Science and Art.        /78 (BP Location: Left arm, Patient Position: Sitting, Cuff Size: Large Adult)   Pulse 115   Temp 97.4 °F (36.3 °C) (Temporal)   Resp 18   Ht  "161.3 cm (63.5\")   Wt 101 kg (222 lb 8 oz)   LMP 03/10/2019 Comment: no mammogram yet   SpO2 99%   BMI 38.80 kg/m²   Physical Exam   Constitutional: She appears well-developed and well-nourished. She is cooperative.   HENT:   Mouth/Throat: Mucous membranes are normal.   Eyes: Conjunctivae are normal. No scleral icterus.   Cardiovascular:   tachy   Pulmonary/Chest: Effort normal.   Abdominal: Soft. Bowel sounds are normal. There is no tenderness.   Incisions healing well   Musculoskeletal: Normal range of motion. She exhibits no edema.   Neurological: She is alert.   Skin: Skin is warm and dry. No rash noted.   Psychiatric: She has a normal mood and affect. Judgment normal.         Assessment:   POD #7 s/p LSG/HHR w/ Dr. Fish on 3/12/19      Plan:  Discussed importance of protein/fluid intake and risk/complications associated w/ suboptimal levels.  Advised 100g prot/day w/ 64 fluid oz.  Instructed to make a conscious effort of getting at least 1oz of fluids/protein every 15 minutes.  Protein samples + suggestions given to patient.  Continue w/ antiemetics.  Advance diet per manual as tolerated.  Increase exercise/activity only as tolerated.  Reviewed lifting restrictions, nothing >25 lbs x 2 more weeks.  Continue vitamins as advised.  Continue PPI.  Continue to avoid ASA/NSAIDs/tobacco x 6 weeks postop, steroids x 8 weeks postop.  Call w/ problems/concerns.    The patient was instructed to follow up in 3 weeks, sooner if needed.   "

## 2019-03-19 NOTE — OUTREACH NOTE
General Surgery Week 1 Survey      Responses   Facility patient discharged from?  [place]   Does the patient have one of the following disease processes/diagnoses(primary or secondary)?  General Surgery   Is there a successful TCM telephone encounter documented?  No   Week 1 attempt successful?  No   Unsuccessful attempts  Attempt 2          Lynette Harley RN

## 2019-03-21 ENCOUNTER — TELEPHONE (OUTPATIENT)
Dept: BARIATRICS/WEIGHT MGMT | Facility: CLINIC | Age: 39
End: 2019-03-21

## 2019-03-21 RX ORDER — PROCHLORPERAZINE MALEATE 5 MG/1
5 TABLET ORAL EVERY 6 HOURS PRN
Qty: 30 TABLET | Refills: 0 | Status: SHIPPED | OUTPATIENT
Start: 2019-03-21

## 2019-03-21 NOTE — TELEPHONE ENCOUNTER
Pt called in stating that she is having severe nausea and dry heaving, and the zofran and phenergan is not working.  Denies V/F/C/Abd pain. Pt stated she is getting maybe 30 grams of protein, and maybe 30 fl ozs. Please advise, thank you.

## 2019-03-21 NOTE — TELEPHONE ENCOUNTER
Notified pt that you have discussed with Dr Fish and a rx for Compazine rx was sent to her pharmacy and to take q6hrs. I let her know that she needs to f/up in the office in the AM, she is scheduling with Maria Alejandra. I also let her know to ED if worsens. Pt verbalized understanding.

## 2019-03-22 ENCOUNTER — OFFICE VISIT (OUTPATIENT)
Dept: BARIATRICS/WEIGHT MGMT | Facility: CLINIC | Age: 39
End: 2019-03-22

## 2019-03-22 ENCOUNTER — HOSPITAL ENCOUNTER (OUTPATIENT)
Dept: ONCOLOGY | Facility: HOSPITAL | Age: 39
Setting detail: INFUSION SERIES
Discharge: HOME | End: 2019-03-22

## 2019-03-22 VITALS
RESPIRATION RATE: 18 BRPM | HEIGHT: 64 IN | WEIGHT: 224 LBS | DIASTOLIC BLOOD PRESSURE: 86 MMHG | TEMPERATURE: 98 F | SYSTOLIC BLOOD PRESSURE: 110 MMHG | HEART RATE: 124 BPM | BODY MASS INDEX: 38.24 KG/M2

## 2019-03-22 VITALS
RESPIRATION RATE: 18 BRPM | WEIGHT: 220.5 LBS | HEART RATE: 92 BPM | DIASTOLIC BLOOD PRESSURE: 70 MMHG | OXYGEN SATURATION: 97 % | BODY MASS INDEX: 37.65 KG/M2 | HEIGHT: 64 IN | SYSTOLIC BLOOD PRESSURE: 114 MMHG | TEMPERATURE: 97.3 F

## 2019-03-22 DIAGNOSIS — E86.0 DEHYDRATION: Primary | ICD-10-CM

## 2019-03-22 DIAGNOSIS — R11.0 NAUSEA: ICD-10-CM

## 2019-03-22 DIAGNOSIS — Z98.84 STATUS POST BARIATRIC SURGERY: Primary | ICD-10-CM

## 2019-03-22 DIAGNOSIS — E66.9 OBESITY, CLASS II, BMI 35-39.9: ICD-10-CM

## 2019-03-22 PROCEDURE — 99024 POSTOP FOLLOW-UP VISIT: CPT | Performed by: PHYSICIAN ASSISTANT

## 2019-03-22 PROCEDURE — 96360 HYDRATION IV INFUSION INIT: CPT

## 2019-03-22 PROCEDURE — 96361 HYDRATE IV INFUSION ADD-ON: CPT

## 2019-03-22 RX ORDER — SODIUM CHLORIDE 9 MG/ML
1000 INJECTION, SOLUTION INTRAVENOUS ONCE
Status: CANCELLED | OUTPATIENT
Start: 2019-03-22

## 2019-03-22 RX ORDER — SODIUM CHLORIDE 9 MG/ML
1000 INJECTION, SOLUTION INTRAVENOUS ONCE
Status: COMPLETED | OUTPATIENT
Start: 2019-03-22 | End: 2019-03-22

## 2019-03-22 RX ORDER — ONDANSETRON 8 MG/1
8 TABLET, ORALLY DISINTEGRATING ORAL EVERY 8 HOURS PRN
Qty: 21 TABLET | Refills: 0 | Status: SHIPPED | OUTPATIENT
Start: 2019-03-22

## 2019-03-22 RX ORDER — SODIUM CHLORIDE 9 MG/ML
1000 INJECTION, SOLUTION INTRAVENOUS CONTINUOUS
Status: DISCONTINUED | OUTPATIENT
Start: 2019-03-22 | End: 2019-03-23 | Stop reason: HOSPADM

## 2019-03-22 RX ORDER — SODIUM CHLORIDE 9 MG/ML
1000 INJECTION, SOLUTION INTRAVENOUS ONCE
OUTPATIENT
Start: 2019-03-22

## 2019-03-22 RX ORDER — PROMETHAZINE HYDROCHLORIDE 25 MG/1
25 TABLET ORAL EVERY 6 HOURS PRN
Qty: 30 TABLET | Refills: 0 | Status: SHIPPED | OUTPATIENT
Start: 2019-03-22

## 2019-03-22 RX ADMIN — SODIUM CHLORIDE 1000 ML/HR: 9 INJECTION, SOLUTION INTRAVENOUS at 14:40

## 2019-03-22 RX ADMIN — SODIUM CHLORIDE 1000 ML: 9 INJECTION, SOLUTION INTRAVENOUS at 13:28

## 2019-03-22 NOTE — DISCHARGE SUMMARY
Discharge Summary    Patient name: Dusty Calhoun    Medical record number: [id number]    Admission date: 3/12/2019  Discharge date:  3/15/2019    Attending physician: Joselin Fish MD    Primary care physician: Michele Maier APRN    Condition on discharge: Stable    Primary Diagnoses:  Morbid obesity with co-morbidities    Operative Procedure:  3/12/19 laparoscopic sleeve gastrectomy and hiatal hernia repair    Hospital Course: The patient is a very pleasant 38 y.o. female that was admitted to the hospital after elective laparoscopic sleeve gastrectomy and laparoscopic hiatal hernia repair.  On POD#1, UGI was without obstruction or leak. The patient had some had some nausea and dysphagia that interfered with oral intake, but by POD#3, was ambulating well, tolerating stage 1 diet, and felt ready to be discharged.  Please see daily progress notes for full details of hospital stay.  Dusty Calhoun was discharged home in good condition with instructions to follow up in the office in 1 week.      Discharge medications:      Discharge Medications      New Medications      Instructions Start Date   HYDROcodone-acetaminophen 7.5-325 MG per tablet  Commonly known as:  NORCO   1 tablet, Oral, Every 4 Hours PRN      omeprazole 40 MG capsule  Commonly known as:  PRILOSEC   40 mg, Oral, Daily      ondansetron 4 MG tablet  Commonly known as:  ZOFRAN   4 mg, Oral, Every 4 Hours PRN      promethazine 12.5 MG tablet  Commonly known as:  PHENERGAN   12.5 mg, Oral, Every 4 Hours PRN         Continue These Medications      Instructions Start Date   levothyroxine 50 MCG tablet  Commonly known as:  SYNTHROID, LEVOTHROID   50 mcg, Oral, Daily      lisinopril 10 MG tablet  Commonly known as:  PRINIVIL,ZESTRIL   10 mg, Oral, Daily      Vitamin B-12 500 MCG sublingual tablet   500 mcg, Sublingual, 3 Times Daily      vitamin D3 5000 units capsule capsule   5,000 Units, Oral, Daily             Discharge instructions:  Per Bariatric  manual      Follow-up appointment: Follow up with Bariatric PA in the office in 1 week.

## 2019-03-22 NOTE — PROGRESS NOTES
"[facility] Bariatric Surgery  [address]  [facility] [id number] [phone]      Patient Name:  Dusty Calhoun.  :  1980      Reason for Visit:  POD #10    HPI:  Dusty Calhoun is a 38 y.o. female s/p LSG/HHR w/ Dr. Fish on 3/12/19    Discharged on POD#3 - c/o postprandial abdominal pain, cramping and then radiation into her chest and shoulders with regurgitation, nausea and poor oral intake.     LOV POD#7- \"doing good\" but only getting 30-40g protein.     Called yesterday with severe nauesa, dry heaving on zofran/ phenergan. Compazine Rx sent, advised eval today.     Says she was doing ok until yesterday with severe nausea.  Denies vomiting. Had very little PO intake.  Also had an episode of mid abdominal pain surrounding umbilicus that intensified and radiated to epigastrium and across upper abdomen bilaterally wrapping around side into back.  She took compazine, phenergan, norco and went to sleep. Previously had not taken pain meds in 24-36hours.  Symptoms resolved, Denies any abdominal pain or nausea today.   Has been taking zofran 4mg every 4 hours, phenergan qhs, compazine started at 5p last night and this morning at 8am.   Has had SOA with activity, feeling weak, occ lightheaded.   Using IS to [id number], denies cough.  Dark yellow urine, minimal amounts.  Had large, firm BM with BRBPR, h/o hemorrhoids and rectal bleeding.  Denies dysphagia, reflux, vomiting and fevers.  Tolerating diet progression - on stage 1.  Had been getting 60g prot/day a few days ago, has decreased each day, only got 10g yesterday.    Drinking 16 fluid oz/day maximum yesterday, most she has gotten in a 24h period since surgery is  38 oz.  Taking Patches: MVI +iron, B1 pill.  On Omeprazole .  Holding ASA , NSAIDs , Tramadol, Hormones, Diuretics , Steroids and Immunologics.  Limited activity, has young children but really laying down all day.     Presurgery weight: 238 pounds.  Today's " weight is 100 kg (220 lb 8 oz) pounds, today's  Body mass index is 38.45 kg/m²., and her weight loss since surgery is 18 pounds.       Past Medical History:   Diagnosis Date   • Abnormal ECG     followed by cardiology annually, negative workup up per patient.    • Abnormal glucose     Was on metformin for DM, d/c soda and A1C normalized.    • Anxiety    • Boil     h/o mulitple boils, never had I&D, no known MRSA   • Constipation     metaucil prn    • Dehydration 3/22/2019   • Depression    • Dyspepsia    • Fatigue    • Fatty liver     noted on US, confirmed during lap jessika (no Biopsy)   • Former smoker    • Hashimoto's disease     last labs showed euthyroid without meds   • Heartburn     rare, doesn't take meds typically.  No h/o EGD or h pylori.    • History of diabetes mellitus     at one point needed Metformin until stopped drinking sugary sodas in 2017   • Hypertension    • Morbid obesity with BMI of 40.0-44.9, adult (CMS/[facility])    • Nausea 3/22/2019   • Nocturnal oxygen desaturation     uses 2 liters of oxygen at night    • Pain in shin    • PONV (postoperative nausea and vomiting)     nasuea only but never vomited    • Shortness of breath    • Suspected sleep apnea     Has home oxygen testing, was on ngihttime oxygen in the past, has yet to have sleep study.    • Thyroid goiter     Followed by US annually, no procedures to date.      Past Surgical History:   Procedure Laterality Date   • ENDOSCOPY     • ENDOSCOPY N/A 3/12/2019    Procedure: ESOPHAGOGASTRODUODENOSCOPY;  Surgeon: Joselin Fish MD;  Location:  [facility];  Service: Bariatric   • GASTRIC SLEEVE LAPAROSCOPIC N/A 3/12/2019    Procedure: GASTRIC SLEEVE LAPAROSCOPIC;  Surgeon: Joselin Fish MD;  Location:  [facility];  Service: Bariatric   • HIATAL HERNIA REPAIR N/A 3/12/2019    Procedure: HIATAL HERNIA REPAIR LAPAROSCOPIC;  Surgeon: Joselin Fish MD;  Location:  [facility];  Service: Bariatric   • LAPAROSCOPIC CHOLECYSTECTOMY       cholelithiasis   • TUBAL ABDOMINAL LIGATION      laparoscopic     Outpatient Medications Marked as Taking for the 3/22/19 encounter (Office Visit) with Marielena Thao PA-C   Medication Sig Dispense Refill   • Cholecalciferol (VITAMIN D3) 5000 units capsule capsule Take 5,000 Units by mouth Daily.     • Cyanocobalamin (VITAMIN B-12) 500 MCG sublingual tablet Place 500 mcg under the tongue 3 (Three) Times a Day.     • HYDROcodone-acetaminophen (NORCO) 7.5-325 MG per tablet Take 1 tablet by mouth Every 4 (Four) Hours As Needed for Moderate Pain  for up to 10 days. 20 tablet 0   • IRON PO Take  by mouth.     • levothyroxine (SYNTHROID, LEVOTHROID) 50 MCG tablet Take 50 mcg by mouth Daily.     • lisinopril (PRINIVIL,ZESTRIL) 10 MG tablet Take 10 mg by mouth Daily.     • Multiple Vitamins-Minerals (MULTIVITAMIN ADULT PO) Take  by mouth.     • omeprazole (PRILOSEC) 40 MG capsule Take 1 capsule by mouth Daily for 60 days. 60 capsule 1   • prochlorperazine (COMPAZINE) 5 MG tablet Take 1 tablet by mouth Every 6 (Six) Hours As Needed for Nausea or Vomiting. 30 tablet 0   • [DISCONTINUED] ondansetron (ZOFRAN) 4 MG tablet Take 1 tablet by mouth Every 4 (Four) Hours As Needed for Nausea. 20 tablet 0   • [DISCONTINUED] promethazine (PHENERGAN) 12.5 MG tablet Take 1 tablet by mouth Every 4 (Four) Hours As Needed for Nausea. 20 tablet 0     No Known Allergies    Social History     Socioeconomic History   • Marital status: Single     Spouse name: Not on file   • Number of children: 3   • Years of education: High School    • Highest education level: Not on file   Occupational History   • Occupation: Student   Tobacco Use   • Smoking status: Former Smoker     Packs/day: 2.00     Years: 15.00     Pack years: 30.00     Types: Cigarettes     Last attempt to quit: 2011     Years since quittin.2   • Smokeless tobacco: Never Used   • Tobacco comment: no second hand smoke in the home    Substance and Sexual Activity   •  "Alcohol use: No   • Drug use: No   • Sexual activity: Defer     Birth control/protection: Surgical     Comment: no hormones   Social History Narrative    Lives in [facility] with 3 children.  Not working, in school for associates in Science and Art.        /70 (BP Location: Left arm, Patient Position: Sitting, Cuff Size: Large Adult)   Pulse 92   Temp 97.3 °F (36.3 °C) (Temporal)   Resp 18   Ht 161.3 cm (63.5\")   Wt 100 kg (220 lb 8 oz)   LMP 03/10/2019 Comment: no mammogram yet   SpO2 97%   BMI 38.45 kg/m²   Physical Exam   Constitutional: She is oriented to person, place, and time. She appears well-developed and well-nourished.   HENT:   Head: Normocephalic and atraumatic.   Cardiovascular: Normal rate, regular rhythm and normal heart sounds.   Pulmonary/Chest: Effort normal and breath sounds normal. No respiratory distress. She has no wheezes.   Abdominal: Soft. Bowel sounds are normal. She exhibits no distension. There is no tenderness.   Incisions healing well   Neurological: She is alert and oriented to person, place, and time.   Skin: Skin is warm and dry.   Psychiatric: She has a normal mood and affect. Her behavior is normal. Judgment and thought content normal.         Assessment:   POD #10 s/p LSG/HHR w/ Dr. Fish on 3/12/19    ICD-10-CM ICD-9-CM   1. Status post bariatric surgery Z98.84 V45.86   2. Nausea R11.0 787.02   3. Obesity, Class II, BMI 35-39.9 E66.9 278.00         Plan: Will obtain labs today, set up IV fluids 2L NS over 2 hours this afternoon. Increase antiemetics to zofran 8mg atc, phenergan 25mg qhs, compazine 5mg atc.  Discussed importance in increasing protein for healing (risk of leak), nutrition, in addition to weight loss.  Goal of 70-100g protein a day, discussed options.  Must increase fluid intake to 64oz daily, or could result in ED/ hospital stay. Continue to advance diet per manual. Increase exercise/activity as tolerated.  Reviewed lifting restrictions, " nothing >25 lbs x 3 weeks postop.  Continue vitamins.  Continue PPI.  Continue to avoid ASA/NSAIDs/tramadol/tobacco x 6 weeks postop, steroids x 8 weeks postop.  Call w/ problems/concerns.    The patient was instructed to follow up as scheduled, sooner if needed.

## 2019-03-22 NOTE — PROGRESS NOTES
[facility] Bariatric Surgery  [address]  [facility] [id number] [phone]      Patient Name:  Dusty Calhoun.  :  1980      Date of Visit: 3/22/2019      Reason for Visit:  POD #10    HPI:  Dusty Calhoun is a 38 y.o. female s/p  LSG/HHR w/ Dr. Fish on 3/12/19    In hospital and at 1 week visit, struggling to get in protein and liquids.  Called yesterday with persistent nausea and poor oral intake, compazine called in and asked to come to office appointment today.  ***    Doing well.  No issues/concerns. Denies {Denies:97220}.  Tolerating diet progression - on stage ***.  Getting ***g prot/day.  Drinking ***fluid oz/day.  Taking {Vitamins:28524}.  On {MEDS:99849}.  Holding {Holding Meds:99704}.  Ambulating.     Presurgery weight: 238 pounds.  Today's weight is   pounds, today's  There is no height or weight on file to calculate BMI., and her weight loss since surgery is *** pounds.         Past Medical History:   Diagnosis Date   • Abnormal ECG     followed by cardiology annually, negative workup up per patient.    • Abnormal glucose     Was on metformin for DM, d/c soda and A1C normalized.    • Anxiety    • Boil     h/o mulitple boils, never had I&D, no known MRSA   • Constipation     metaucil prn    • Depression    • Dyspepsia    • Fatigue    • Fatty liver     noted on US, confirmed during lap jessika (no Biopsy)   • Former smoker    • Hashimoto's disease     last labs showed euthyroid without meds   • Heartburn     rare, doesn't take meds typically.  No h/o EGD or h pylori.    • History of diabetes mellitus     at one point needed Metformin until stopped drinking sugary sodas in 2017   • Hypertension    • Morbid obesity with BMI of 40.0-44.9, adult (CMS/HCC)    • Nocturnal oxygen desaturation     uses 2 liters of oxygen at night    • Pain in shin    • PONV (postoperative nausea and vomiting)     nasuea only but never vomited    • Shortness of breath    • Suspected  sleep apnea     Has home oxygen testing, was on ngihttime oxygen in the past, has yet to have sleep study.    • Thyroid goiter     Followed by US annually, no procedures to date.      Past Surgical History:   Procedure Laterality Date   • ENDOSCOPY     • ENDOSCOPY N/A 3/12/2019    Procedure: ESOPHAGOGASTRODUODENOSCOPY;  Surgeon: Joselin Fish MD;  Location:  [facility];  Service: Bariatric   • GASTRIC SLEEVE LAPAROSCOPIC N/A 3/12/2019    Procedure: GASTRIC SLEEVE LAPAROSCOPIC;  Surgeon: Joselin Fish MD;  Location:  [facility];  Service: Bariatric   • HIATAL HERNIA REPAIR N/A 3/12/2019    Procedure: HIATAL HERNIA REPAIR LAPAROSCOPIC;  Surgeon: Joselin Fish MD;  Location:  [facility];  Service: Bariatric   • LAPAROSCOPIC CHOLECYSTECTOMY      cholelithiasis   • TUBAL ABDOMINAL LIGATION      laparoscopic     No outpatient medications have been marked as taking for the 3/22/19 encounter (Appointment) with Joselin Fish MD.     No Known Allergies    Social History     Socioeconomic History   • Marital status: Single     Spouse name: Not on file   • Number of children: 3   • Years of education: High School    • Highest education level: Not on file   Occupational History   • Occupation: Student   Tobacco Use   • Smoking status: Former Smoker     Packs/day: 2.00     Years: 15.00     Pack years: 30.00     Types: Cigarettes     Last attempt to quit:      Years since quittin.2   • Smokeless tobacco: Never Used   • Tobacco comment: no second hand smoke in the home    Substance and Sexual Activity   • Alcohol use: No   • Drug use: No   • Sexual activity: Defer     Birth control/protection: Surgical     Comment: no hormones   Social History Narrative    Lives in [facility] with 3 children.  Not working, in school for associates in Science and Art.        LMP 03/10/2019 Comment: no mammogram yet   Physical Exam      Assessment:   POD # 10      Plan:  Doing well. Continue to  advance diet per manual.  Increase protein intake to 100g/day.  Actigall Rx given ***.  Increase exercise/activity as tolerated.  Reviewed lifting restrictions, nothing >25 lbs x 2 more weeks.  Continue vitamins.  Continue PPI.  Continue to avoid ASA/NSAIDs/Steroids x 6 weeks postop.  Call w/ problems/concerns.    The patient was instructed to follow up in 3 weeks, sooner if needed.     Joselin Fish MD

## 2019-03-23 ENCOUNTER — READMISSION MANAGEMENT (OUTPATIENT)
Dept: CALL CENTER | Facility: HOSPITAL | Age: 39
End: 2019-03-23

## 2019-03-23 LAB
ALBUMIN SERPL-MCNC: 4.8 G/DL (ref 3.5–5.5)
ALBUMIN/GLOB SERPL: 1.8 {RATIO} (ref 1.2–2.2)
ALP SERPL-CCNC: 70 IU/L (ref 39–117)
ALT SERPL-CCNC: 30 IU/L (ref 0–32)
AMYLASE SERPL-CCNC: 76 U/L (ref 31–124)
AST SERPL-CCNC: 20 IU/L (ref 0–40)
BASOPHILS # BLD AUTO: 0 X10E3/UL (ref 0–0.2)
BASOPHILS NFR BLD AUTO: 0 %
BILIRUB SERPL-MCNC: 0.4 MG/DL (ref 0–1.2)
BUN SERPL-MCNC: 13 MG/DL (ref 6–20)
BUN/CREAT SERPL: 12 (ref 9–23)
CALCIUM SERPL-MCNC: 9.7 MG/DL (ref 8.7–10.2)
CHLORIDE SERPL-SCNC: 100 MMOL/L (ref 96–106)
CO2 SERPL-SCNC: 20 MMOL/L (ref 20–29)
CREAT SERPL-MCNC: 1.13 MG/DL (ref 0.57–1)
EOSINOPHIL # BLD AUTO: 0.1 X10E3/UL (ref 0–0.4)
EOSINOPHIL NFR BLD AUTO: 1 %
ERYTHROCYTE [DISTWIDTH] IN BLOOD BY AUTOMATED COUNT: 13.9 % (ref 12.3–15.4)
GLOBULIN SER CALC-MCNC: 2.6 G/DL (ref 1.5–4.5)
GLUCOSE SERPL-MCNC: 95 MG/DL (ref 65–99)
HCT VFR BLD AUTO: 42.9 % (ref 34–46.6)
HGB BLD-MCNC: 14.9 G/DL (ref 11.1–15.9)
IMM GRANULOCYTES # BLD AUTO: 0 X10E3/UL (ref 0–0.1)
IMM GRANULOCYTES NFR BLD AUTO: 0 %
LIPASE SERPL-CCNC: 89 U/L (ref 14–72)
LYMPHOCYTES # BLD AUTO: 3.9 X10E3/UL (ref 0.7–3.1)
LYMPHOCYTES NFR BLD AUTO: 35 %
MCH RBC QN AUTO: 29.6 PG (ref 26.6–33)
MCHC RBC AUTO-ENTMCNC: 34.7 G/DL (ref 31.5–35.7)
MCV RBC AUTO: 85 FL (ref 79–97)
MONOCYTES # BLD AUTO: 0.9 X10E3/UL (ref 0.1–0.9)
MONOCYTES NFR BLD AUTO: 8 %
NEUTROPHILS # BLD AUTO: 6 X10E3/UL (ref 1.4–7)
NEUTROPHILS NFR BLD AUTO: 56 %
PLATELET # BLD AUTO: 477 X10E3/UL (ref 150–379)
POTASSIUM SERPL-SCNC: 4.9 MMOL/L (ref 3.5–5.2)
PREALB SERPL-MCNC: 25 MG/DL (ref 14–35)
PROT SERPL-MCNC: 7.4 G/DL (ref 6–8.5)
RBC # BLD AUTO: 5.03 X10E6/UL (ref 3.77–5.28)
SODIUM SERPL-SCNC: 138 MMOL/L (ref 134–144)
WBC # BLD AUTO: 10.9 X10E3/UL (ref 3.4–10.8)

## 2019-03-23 NOTE — OUTREACH NOTE
General Surgery Week 2 Survey      Responses   Facility patient discharged from?  [facility]   Does the patient have one of the following disease processes/diagnoses(primary or secondary)?  General Surgery   Week 2 attempt successful?  No   Unsuccessful attempts  Attempt 1          Jas Acuna RN

## 2019-03-25 ENCOUNTER — READMISSION MANAGEMENT (OUTPATIENT)
Dept: CALL CENTER | Facility: HOSPITAL | Age: 39
End: 2019-03-25

## 2019-03-25 NOTE — OUTREACH NOTE
General Surgery Week 2 Survey      Responses   Facility patient discharged from?  [facility]   Does the patient have one of the following disease processes/diagnoses(primary or secondary)?  General Surgery   Week 2 attempt successful?  No   Unsuccessful attempts  Attempt 2          Lynette Harley RN

## 2019-04-15 ENCOUNTER — OFFICE VISIT (OUTPATIENT)
Dept: BARIATRICS/WEIGHT MGMT | Facility: CLINIC | Age: 39
End: 2019-04-15

## 2019-04-15 VITALS
RESPIRATION RATE: 18 BRPM | DIASTOLIC BLOOD PRESSURE: 62 MMHG | TEMPERATURE: 97.8 F | SYSTOLIC BLOOD PRESSURE: 100 MMHG | BODY MASS INDEX: 34.91 KG/M2 | WEIGHT: 204.5 LBS | OXYGEN SATURATION: 99 % | HEART RATE: 83 BPM | HEIGHT: 64 IN

## 2019-04-15 DIAGNOSIS — E55.9 VITAMIN D DEFICIENCY: ICD-10-CM

## 2019-04-15 DIAGNOSIS — R79.0 ABNORMAL BLOOD LEVEL OF IRON: ICD-10-CM

## 2019-04-15 DIAGNOSIS — Z98.84 S/P BARIATRIC SURGERY: ICD-10-CM

## 2019-04-15 DIAGNOSIS — E66.9 OBESITY, CLASS II, BMI 35-39.9: ICD-10-CM

## 2019-04-15 DIAGNOSIS — R10.13 DYSPEPSIA: Primary | ICD-10-CM

## 2019-04-15 DIAGNOSIS — R53.83 FATIGUE, UNSPECIFIED TYPE: ICD-10-CM

## 2019-04-15 PROCEDURE — 99024 POSTOP FOLLOW-UP VISIT: CPT | Performed by: PHYSICIAN ASSISTANT

## 2019-04-15 NOTE — PROGRESS NOTES
"[facility] Bariatric Surgery  [address]  [facility] [id number] [phone]      Patient Name:  Dusty Calhoun.  :  1980      Date of Visit: 04/15/2019      Reason for Visit:  1 month postop    HPI:  Dusty Calhoun is a 38 y.o. female s/p LSG/HHR w/ Dr. Fish on 3/12/19    LOV POD#10 w/ severe nausea/dry heaves, sent for IVFs, sx improved.     Overall she is feeling good.  No overwhelming complaints.  Still feels she is adjusting.  Admits it has been a struggle to \"retrain\" herself to \"sip\" all day long.  Drinking Smart Water, getting 40 oz/day.  \"Trying\" w/ her protein.  Still getting only 30-60g protein/day w/ Premiere protein.  Plans to get some protein grijalva but just has not yet.  Tried protein powders but cannot tolerate.      Has progressed to stage 5 diet but honestly \"eating very little.\"  Says if she eats anymore than a spoonful of anything she will have nausea.  Taking Zofran, Phenergan, Compazine prn.  Says Phenergan is the only one that really works, but tries to only take at bedtime b/c it causes too much drowsiness.  Continues on Omeprazole 40mg daily.  Denies dysphagia/reflux/indigestion.  Does not have a gallbladder.     Only has abdominal pain if she wears tight pants.  Bowels still not moving well.  Taking Colace only prn.  Using PrepH suppositories b/c she has started having some rectal bleeding.      Exercising - treadmill 30min/day, 5x/week, but starts to get dizzy/lightheaded if she does much more.    Labs 3/22/19 - prealbumin 25, amylase 76, lipase 89, Cr 1.13, WBC 10.9, Hgb 14.9.  Doing MVI patch + iron patch + PO Vit D, Vit B1 and Vit B12.       Presurgery weight: 238 pounds.  Today's weight is 92.8 kg (204 lb 8 oz) pounds, today's  Body mass index is 35.66 kg/m²., and her weight loss since surgery is 34 pounds.       Past Medical History:   Diagnosis Date   • Abnormal ECG     followed by cardiology annually, negative workup up per patient.  "   • Abnormal glucose     Was on metformin for DM, d/c soda and A1C normalized.    • Anxiety    • Boil     h/o mulitple boils, never had I&D, no known MRSA   • Constipation     metaucil prn    • Dehydration 3/22/2019   • Depression    • Dyspepsia    • Fatigue    • Fatty liver     noted on US, confirmed during lap jessika (no Biopsy)   • Former smoker    • Hashimoto's disease     last labs showed euthyroid without meds   • Heartburn     rare, doesn't take meds typically.  No h/o EGD or h pylori.    • History of diabetes mellitus     at one point needed Metformin until stopped drinking sugary sodas in 2017   • Hypertension    • Morbid obesity with BMI of 40.0-44.9, adult (CMS/[facility])    • Nausea 3/22/2019   • Nocturnal oxygen desaturation     uses 2 liters of oxygen at night    • Pain in shin    • PONV (postoperative nausea and vomiting)     nasuea only but never vomited    • Shortness of breath    • Suspected sleep apnea     Has home oxygen testing, was on ngihttime oxygen in the past, has yet to have sleep study.    • Thyroid goiter     Followed by US annually, no procedures to date.      Past Surgical History:   Procedure Laterality Date   • ENDOSCOPY     • ENDOSCOPY N/A 3/12/2019    Procedure: ESOPHAGOGASTRODUODENOSCOPY;  Surgeon: Joselin Fish MD;  Location:  [facility];  Service: Bariatric   • GASTRIC SLEEVE LAPAROSCOPIC N/A 3/12/2019    Procedure: GASTRIC SLEEVE LAPAROSCOPIC;  Surgeon: Joselin Fish MD;  [facility];  Service: Bariatric   • HIATAL HERNIA REPAIR N/A 3/12/2019    Procedure: HIATAL HERNIA REPAIR LAPAROSCOPIC;  Surgeon: Joselin Fish MD;  Location:  [facility];  Service: Bariatric   • LAPAROSCOPIC CHOLECYSTECTOMY  2014    cholelithiasis   • TUBAL ABDOMINAL LIGATION  2011    laparoscopic     Outpatient Medications Marked as Taking for the 4/15/19 encounter (Office Visit) with Fátima Tsang PA   Medication Sig Dispense Refill   • Cholecalciferol (VITAMIN D3) 5000 units  "capsule capsule Take 5,000 Units by mouth Daily.     • Cyanocobalamin (VITAMIN B-12) 500 MCG sublingual tablet Place 500 mcg under the tongue 3 (Three) Times a Day.     • IRON PO Take  by mouth.     • levothyroxine (SYNTHROID, LEVOTHROID) 50 MCG tablet Take 50 mcg by mouth Daily.     • lisinopril (PRINIVIL,ZESTRIL) 10 MG tablet Take 10 mg by mouth Daily.     • Multiple Vitamins-Minerals (MULTIVITAMIN ADULT PO) Take  by mouth.     • omeprazole (PRILOSEC) 40 MG capsule Take 1 capsule by mouth Daily for 60 days. 60 capsule 1   • ondansetron ODT (ZOFRAN ODT) 8 MG disintegrating tablet Take 1 tablet by mouth Every 8 (Eight) Hours As Needed for Nausea or Vomiting. 21 tablet 0   • prochlorperazine (COMPAZINE) 5 MG tablet Take 1 tablet by mouth Every 6 (Six) Hours As Needed for Nausea or Vomiting. 30 tablet 0   • promethazine (PHENERGAN) 25 MG tablet Take 1 tablet by mouth Every 6 (Six) Hours As Needed for Nausea. 30 tablet 0     No Known Allergies    Social History     Socioeconomic History   • Marital status: Single     Spouse name: Not on file   • Number of children: 3   • Years of education: High School    • Highest education level: Not on file   Occupational History   • Occupation: Student   Tobacco Use   • Smoking status: Former Smoker     Packs/day: 2.00     Years: 15.00     Pack years: 30.00     Types: Cigarettes     Last attempt to quit:      Years since quittin.2   • Smokeless tobacco: Never Used   • Tobacco comment: no second hand smoke in the home    Substance and Sexual Activity   • Alcohol use: No   • Drug use: No   • Sexual activity: Defer     Birth control/protection: Surgical     Comment: no hormones   Social History Narrative    Lives in [place] with 3 children.  Not working, in school for associates in Science and Art.        /62 (BP Location: Left arm, Patient Position: Sitting, Cuff Size: Large Adult)   Pulse 83   Temp 97.8 °F (36.6 °C) (Temporal)   Resp 18   Ht 161.3 cm (63.5\") "   Wt 92.8 kg (204 lb 8 oz)   SpO2 99%   BMI 35.66 kg/m²   Physical Exam   Constitutional: She appears well-developed and well-nourished. She is cooperative.   HENT:   Mouth/Throat: Oropharynx is clear and moist and mucous membranes are normal.   Eyes: Conjunctivae are normal. No scleral icterus.   Cardiovascular: Normal rate.   Pulmonary/Chest: Effort normal.   Abdominal: Soft. There is no tenderness.   Musculoskeletal: Normal range of motion. She exhibits no edema.   Neurological: She is alert.   Skin: Skin is warm and dry. No rash noted.   Psychiatric: She has a normal mood and affect. Judgment normal.         Assessment:   1 month s/p LSG/HHR w/ Dr. Fish on 3/12/19    ICD-10-CM ICD-9-CM   1. Dyspepsia R10.13 536.8   2. Fatigue, unspecified type R53.83 780.79   3. Vitamin D deficiency E55.9 268.9   4. Abnormal blood level of iron R79.0 790.6   5. Obesity, Class II, BMI 35-39.9 E66.9 278.00   6. S/P bariatric surgery Z98.84 V45.86       Plan:  Continue to focus on protein 100g/day w/ 64+fluid oz/day.  Increase exercise/activity only as tolerated.  Advised Colace BID w/ Miralax once to twice daily w/ MOM prn.  Routine labs ordered.  Continue current vitamin regimen w/ adjustments pending lab results.  Continue PPI.  Continue to avoid ASA/NSAIDs/tobacco x 6 weeks postop, steroids x 8 weeks postop.  Call w/ problems/concerns.      The patient was instructed to follow up in 2 months, sooner if needed.

## 2019-04-18 LAB
25(OH)D3+25(OH)D2 SERPL-MCNC: 69.6 NG/ML (ref 30–100)
ALBUMIN SERPL-MCNC: 4.7 G/DL (ref 3.5–5.5)
ALBUMIN/GLOB SERPL: 2.2 {RATIO} (ref 1.2–2.2)
ALP SERPL-CCNC: 64 IU/L (ref 39–117)
ALT SERPL-CCNC: 17 IU/L (ref 0–32)
AST SERPL-CCNC: 15 IU/L (ref 0–40)
BASOPHILS # BLD AUTO: 0 X10E3/UL (ref 0–0.2)
BASOPHILS NFR BLD AUTO: 0 %
BILIRUB SERPL-MCNC: 0.8 MG/DL (ref 0–1.2)
BUN SERPL-MCNC: 8 MG/DL (ref 6–20)
BUN/CREAT SERPL: 8 (ref 9–23)
CALCIUM SERPL-MCNC: 9.6 MG/DL (ref 8.7–10.2)
CHLORIDE SERPL-SCNC: 103 MMOL/L (ref 96–106)
CO2 SERPL-SCNC: 20 MMOL/L (ref 20–29)
CREAT SERPL-MCNC: 0.99 MG/DL (ref 0.57–1)
EOSINOPHIL # BLD AUTO: 0.1 X10E3/UL (ref 0–0.4)
EOSINOPHIL NFR BLD AUTO: 1 %
ERYTHROCYTE [DISTWIDTH] IN BLOOD BY AUTOMATED COUNT: 15.3 % (ref 12.3–15.4)
FERRITIN SERPL-MCNC: 158 NG/ML (ref 15–150)
FOLATE SERPL-MCNC: 7.9 NG/ML
GLOBULIN SER CALC-MCNC: 2.1 G/DL (ref 1.5–4.5)
GLUCOSE SERPL-MCNC: 86 MG/DL (ref 65–99)
HCT VFR BLD AUTO: 42.3 % (ref 34–46.6)
HGB BLD-MCNC: 14.2 G/DL (ref 11.1–15.9)
IMM GRANULOCYTES # BLD AUTO: 0 X10E3/UL (ref 0–0.1)
IMM GRANULOCYTES NFR BLD AUTO: 0 %
IRON SERPL-MCNC: 50 UG/DL (ref 27–159)
LYMPHOCYTES # BLD AUTO: 2.7 X10E3/UL (ref 0.7–3.1)
LYMPHOCYTES NFR BLD AUTO: 32 %
Lab: NORMAL
MCH RBC QN AUTO: 29.1 PG (ref 26.6–33)
MCHC RBC AUTO-ENTMCNC: 33.6 G/DL (ref 31.5–35.7)
MCV RBC AUTO: 87 FL (ref 79–97)
METHYLMALONATE SERPL-SCNC: 53 NMOL/L (ref 0–378)
MONOCYTES # BLD AUTO: 0.8 X10E3/UL (ref 0.1–0.9)
MONOCYTES NFR BLD AUTO: 9 %
NEUTROPHILS # BLD AUTO: 4.8 X10E3/UL (ref 1.4–7)
NEUTROPHILS NFR BLD AUTO: 58 %
PLATELET # BLD AUTO: 260 X10E3/UL (ref 150–379)
POTASSIUM SERPL-SCNC: 4.2 MMOL/L (ref 3.5–5.2)
PREALB SERPL-MCNC: 20 MG/DL (ref 14–35)
PROT SERPL-MCNC: 6.8 G/DL (ref 6–8.5)
RBC # BLD AUTO: 4.88 X10E6/UL (ref 3.77–5.28)
SODIUM SERPL-SCNC: 140 MMOL/L (ref 134–144)
TSH SERPL DL<=0.005 MIU/L-ACNC: 3.07 UIU/ML (ref 0.45–4.5)
VIT B1 BLD-SCNC: 337.5 NMOL/L (ref 66.5–200)
WBC # BLD AUTO: 8.3 X10E3/UL (ref 3.4–10.8)

## 2019-07-16 ENCOUNTER — OFFICE VISIT (OUTPATIENT)
Dept: BARIATRICS/WEIGHT MGMT | Facility: CLINIC | Age: 39
End: 2019-07-16

## 2019-07-16 VITALS
HEART RATE: 89 BPM | DIASTOLIC BLOOD PRESSURE: 68 MMHG | BODY MASS INDEX: 29.37 KG/M2 | OXYGEN SATURATION: 98 % | TEMPERATURE: 97.1 F | HEIGHT: 64 IN | WEIGHT: 172 LBS | SYSTOLIC BLOOD PRESSURE: 116 MMHG | RESPIRATION RATE: 18 BRPM

## 2019-07-16 DIAGNOSIS — E66.9 OBESITY, CLASS I, BMI 30-34.9: ICD-10-CM

## 2019-07-16 DIAGNOSIS — R10.13 DYSPEPSIA: Primary | ICD-10-CM

## 2019-07-16 DIAGNOSIS — E03.9 HYPOTHYROIDISM, UNSPECIFIED TYPE: ICD-10-CM

## 2019-07-16 DIAGNOSIS — R79.0 ABNORMAL BLOOD LEVEL OF IRON: ICD-10-CM

## 2019-07-16 DIAGNOSIS — Z98.84 S/P BARIATRIC SURGERY: ICD-10-CM

## 2019-07-16 DIAGNOSIS — R53.83 FATIGUE, UNSPECIFIED TYPE: ICD-10-CM

## 2019-07-16 DIAGNOSIS — I10 ESSENTIAL HYPERTENSION: ICD-10-CM

## 2019-07-16 DIAGNOSIS — E55.9 VITAMIN D DEFICIENCY: ICD-10-CM

## 2019-07-16 PROCEDURE — 99214 OFFICE O/P EST MOD 30 MIN: CPT | Performed by: PHYSICIAN ASSISTANT

## 2019-07-16 RX ORDER — DOCUSATE SODIUM 100 MG/1
100 CAPSULE, LIQUID FILLED ORAL 2 TIMES DAILY
COMMUNITY

## 2019-07-16 RX ORDER — OMEPRAZOLE 40 MG/1
40 CAPSULE, DELAYED RELEASE ORAL DAILY
COMMUNITY
End: 2019-07-19 | Stop reason: SDUPTHER

## 2019-07-16 NOTE — PROGRESS NOTES
[facility] Bariatric Surgery  [address]  [facility], located within [facility] [id number] [phone]      Patient Name:  Dusty Calhoun.  :  1980      Date of Visit: 2019      Reason for Visit:  routine f/up - 4 months postop    HPI:  Dusty Calhoun is a 38 y.o. female s/p LSG/HHR w/ Dr. Fish on 3/12/19    In the last 2 weeks has started having issues w/ orthostasis - gets dizzy when she stands up too quickly.  Working to increase hydration, but feels that is a nonissue.  Continues on BP med (Lisinopril 10mg daily).  Has not discussed w/ PCP.  Only other issue is persisting constipation to the point of impactions.  Says was an issue preop as well, but not to this extent.  PCP recently prescribed docusate which is only helping some.    Otherwise doing great.  Tolerating PO w/out issue.  Getting 100g prot/day.  No further issues w/ nausea, dry heaves.  Denies abd.pain.  Continues on Omeprazole 40mg daily - feels she still needs it.  Exercising - 5x/week, but feels she needs to start working on strengthening/toning.    Labs 4/15/19 looked good.  Wearing patches: MVI, iron, B12, Vit D, biotin.      Presurgery weight: 238 pounds.  Today's weight is 78 kg (172 lb) pounds, today's  Body mass index is 29.99 kg/m²., and her weight loss since surgery is 66 pounds.       Past Medical History:   Diagnosis Date   • Abnormal ECG     followed by cardiology annually, negative workup up per patient.    • Abnormal glucose     Was on metformin for DM, d/c soda and A1C normalized.    • Anxiety    • Boil     h/o mulitple boils, never had I&D, no known MRSA   • Constipation     metaucil prn    • Dehydration 3/22/2019   • Depression    • Dyspepsia    • Fatigue    • Fatty liver     noted on US, confirmed during lap jessika (no Biopsy)   • Former smoker    • Hashimoto's disease     last labs showed euthyroid without meds   • Heartburn     rare, doesn't take meds typically.  No h/o EGD or h pylori.    • History of  diabetes mellitus     at one point needed Metformin until stopped drinking sugary sodas in 2017   • Hypertension    • Morbid obesity with BMI of 40.0-44.9, adult (CMS/[facility])    • Nausea 3/22/2019   • Nocturnal oxygen desaturation     uses 2 liters of oxygen at night    • Pain in shin    • PONV (postoperative nausea and vomiting)     nasuea only but never vomited    • Shortness of breath    • Suspected sleep apnea     Has home oxygen testing, was on ngihttime oxygen in the past, has yet to have sleep study.    • Thyroid goiter     Followed by US annually, no procedures to date.      Past Surgical History:   Procedure Laterality Date   • ENDOSCOPY     • ENDOSCOPY N/A 3/12/2019    Procedure: ESOPHAGOGASTRODUODENOSCOPY;  Surgeon: Joselin Fish MD;  Location:  [facility];  Service: Bariatric   • GASTRIC SLEEVE LAPAROSCOPIC N/A 3/12/2019    Procedure: GASTRIC SLEEVE LAPAROSCOPIC;  Surgeon: Joselin Fish MD;  Location:  [facility];  Service: Bariatric   • HIATAL HERNIA REPAIR N/A 3/12/2019    Procedure: HIATAL HERNIA REPAIR LAPAROSCOPIC;  Surgeon: Joselin Fish MD;  Location:  [facility];  Service: Bariatric   • LAPAROSCOPIC CHOLECYSTECTOMY  2014    cholelithiasis   • TUBAL ABDOMINAL LIGATION  2011    laparoscopic     Outpatient Medications Marked as Taking for the 7/16/19 encounter (Office Visit) with Fátima Tsang PA   Medication Sig Dispense Refill   • Cholecalciferol (VITAMIN D3) 5000 units capsule capsule Take 5,000 Units by mouth Daily.     • Cyanocobalamin (VITAMIN B-12) 500 MCG sublingual tablet Place 500 mcg under the tongue 3 (Three) Times a Day.     • docusate sodium (COLACE) 100 MG capsule Take 100 mg by mouth 2 (Two) Times a Day.     • IRON PO Take  by mouth.     • levothyroxine (SYNTHROID, LEVOTHROID) 50 MCG tablet Take 50 mcg by mouth Daily.     • lisinopril (PRINIVIL,ZESTRIL) 10 MG tablet Take 10 mg by mouth Daily.     • Multiple Vitamins-Minerals (MULTIVITAMIN ADULT PO) Take  by  "mouth.     • omeprazole (priLOSEC) 40 MG capsule Take 40 mg by mouth Daily.     • ondansetron ODT (ZOFRAN ODT) 8 MG disintegrating tablet Take 1 tablet by mouth Every 8 (Eight) Hours As Needed for Nausea or Vomiting. 21 tablet 0   • prochlorperazine (COMPAZINE) 5 MG tablet Take 1 tablet by mouth Every 6 (Six) Hours As Needed for Nausea or Vomiting. 30 tablet 0   • promethazine (PHENERGAN) 25 MG tablet Take 1 tablet by mouth Every 6 (Six) Hours As Needed for Nausea. 30 tablet 0     No Known Allergies    Social History     Socioeconomic History   • Marital status: Single     Spouse name: Not on file   • Number of children: 3   • Years of education: High School    • Highest education level: Not on file   Occupational History   • Occupation: Student   Tobacco Use   • Smoking status: Former Smoker     Packs/day: 2.00     Years: 15.00     Pack years: 30.00     Types: Cigarettes     Last attempt to quit:      Years since quittin.5   • Smokeless tobacco: Never Used   • Tobacco comment: no second hand smoke in the home    Substance and Sexual Activity   • Alcohol use: No   • Drug use: No   • Sexual activity: Defer     Birth control/protection: Surgical     Comment: no hormones   Social History Narrative    Lives in [facility] with 3 children.  Not working, in school for associates in Science and Art.        /68 (BP Location: Left arm, Patient Position: Sitting, Cuff Size: Adult)   Pulse 89   Temp 97.1 °F (36.2 °C) (Temporal)   Resp 18   Ht 161.3 cm (63.5\")   Wt 78 kg (172 lb)   SpO2 98%   BMI 29.99 kg/m²   Physical Exam   Constitutional: She appears well-developed and well-nourished. She is cooperative.   HENT:   Mouth/Throat: Oropharynx is clear and moist and mucous membranes are normal.   Eyes: Conjunctivae are normal. No scleral icterus.   Cardiovascular: Normal rate.   Pulmonary/Chest: Effort normal.   Abdominal: Soft. There is no tenderness.   Musculoskeletal: Normal range of motion. She " exhibits no edema.   Neurological: She is alert.   Skin: Skin is warm and dry. No rash noted.   Psychiatric: She has a normal mood and affect. Judgment normal.         Assessment:   4 months s/p LSG/HHR w/ Dr. Fish on 3/12/19    ICD-10-CM ICD-9-CM   1. Dyspepsia R10.13 536.8   2. Fatigue, unspecified type R53.83 780.79   3. Vitamin D deficiency E55.9 268.9   4. Abnormal blood level of iron R79.0 790.6   5. Hypothyroidism, unspecified type E03.9 244.9   6. Essential hypertension I10 401.9   7. Obesity, Class I, BMI 30-34.9 E66.9 278.00   8. S/P bariatric surgery Z98.84 V45.86       Plan:  Continue to focus on protein 100g/day w/ 64+fluid oz/day.  Continue w/ routine exercise and healthy habits.  Routine labs ordered.  Continue current vitamin regimen w/ adjustments pending lab results.  Discuss orthostasis + persisting constipation issues w/ PCP.  Call w/ problems/concerns.      The patient was instructed to follow up in 3 months, sooner if needed.

## 2019-07-18 LAB
25(OH)D3+25(OH)D2 SERPL-MCNC: 33.4 NG/ML (ref 30–100)
ALBUMIN SERPL-MCNC: 4.4 G/DL (ref 3.5–5.5)
ALBUMIN/GLOB SERPL: 1.7 {RATIO} (ref 1.2–2.2)
ALP SERPL-CCNC: 64 IU/L (ref 39–117)
ALT SERPL-CCNC: 14 IU/L (ref 0–32)
AST SERPL-CCNC: 12 IU/L (ref 0–40)
BASOPHILS # BLD AUTO: 0 X10E3/UL (ref 0–0.2)
BASOPHILS NFR BLD AUTO: 0 %
BILIRUB SERPL-MCNC: 0.5 MG/DL (ref 0–1.2)
BUN SERPL-MCNC: 18 MG/DL (ref 6–20)
BUN/CREAT SERPL: 24 (ref 9–23)
CALCIUM SERPL-MCNC: 9.6 MG/DL (ref 8.7–10.2)
CHLORIDE SERPL-SCNC: 100 MMOL/L (ref 96–106)
CO2 SERPL-SCNC: 20 MMOL/L (ref 20–29)
CREAT SERPL-MCNC: 0.76 MG/DL (ref 0.57–1)
EOSINOPHIL # BLD AUTO: 0 X10E3/UL (ref 0–0.4)
EOSINOPHIL NFR BLD AUTO: 0 %
ERYTHROCYTE [DISTWIDTH] IN BLOOD BY AUTOMATED COUNT: 13.9 % (ref 12.3–15.4)
FERRITIN SERPL-MCNC: 113 NG/ML (ref 15–150)
FOLATE SERPL-MCNC: 11 NG/ML
GLOBULIN SER CALC-MCNC: 2.6 G/DL (ref 1.5–4.5)
GLUCOSE SERPL-MCNC: 88 MG/DL (ref 65–99)
HCT VFR BLD AUTO: 41.4 % (ref 34–46.6)
HGB BLD-MCNC: 13.7 G/DL (ref 11.1–15.9)
IMM GRANULOCYTES # BLD AUTO: 0 X10E3/UL (ref 0–0.1)
IMM GRANULOCYTES NFR BLD AUTO: 0 %
IRON SERPL-MCNC: 61 UG/DL (ref 27–159)
LYMPHOCYTES # BLD AUTO: 2.3 X10E3/UL (ref 0.7–3.1)
LYMPHOCYTES NFR BLD AUTO: 25 %
Lab: NORMAL
MCH RBC QN AUTO: 29.7 PG (ref 26.6–33)
MCHC RBC AUTO-ENTMCNC: 33.1 G/DL (ref 31.5–35.7)
MCV RBC AUTO: 90 FL (ref 79–97)
METHYLMALONATE SERPL-SCNC: 105 NMOL/L (ref 0–378)
MONOCYTES # BLD AUTO: 0.7 X10E3/UL (ref 0.1–0.9)
MONOCYTES NFR BLD AUTO: 7 %
NEUTROPHILS # BLD AUTO: 6 X10E3/UL (ref 1.4–7)
NEUTROPHILS NFR BLD AUTO: 68 %
PLATELET # BLD AUTO: 337 X10E3/UL (ref 150–450)
POTASSIUM SERPL-SCNC: 4.1 MMOL/L (ref 3.5–5.2)
PREALB SERPL-MCNC: 22 MG/DL (ref 14–35)
PROT SERPL-MCNC: 7 G/DL (ref 6–8.5)
RBC # BLD AUTO: 4.62 X10E6/UL (ref 3.77–5.28)
SODIUM SERPL-SCNC: 139 MMOL/L (ref 134–144)
TSH SERPL DL<=0.005 MIU/L-ACNC: 2.71 UIU/ML (ref 0.45–4.5)
VIT B1 BLD-SCNC: 290.2 NMOL/L (ref 66.5–200)
WBC # BLD AUTO: 9 X10E3/UL (ref 3.4–10.8)

## 2019-07-19 ENCOUNTER — TELEPHONE (OUTPATIENT)
Dept: BARIATRICS/WEIGHT MGMT | Facility: CLINIC | Age: 39
End: 2019-07-19

## 2019-07-19 RX ORDER — OMEPRAZOLE 40 MG/1
40 CAPSULE, DELAYED RELEASE ORAL DAILY
Qty: 90 CAPSULE | Refills: 0 | Status: SHIPPED | OUTPATIENT
Start: 2019-07-19 | End: 2019-10-19 | Stop reason: SDUPTHER

## 2019-07-19 NOTE — TELEPHONE ENCOUNTER
Pt called in stating that at her lov on 7/16/2019 she asked for a refill on her Omeprazole 40mg and it was not called into her pharmacy.  Please advise, thank you.

## 2019-10-21 ENCOUNTER — PATIENT MESSAGE (OUTPATIENT)
Dept: BARIATRICS/WEIGHT MGMT | Facility: CLINIC | Age: 39
End: 2019-10-21

## 2019-10-21 RX ORDER — OMEPRAZOLE 40 MG/1
40 CAPSULE, DELAYED RELEASE ORAL DAILY
Qty: 90 CAPSULE | Refills: 0 | OUTPATIENT
Start: 2019-10-21

## 2019-10-21 RX ORDER — OMEPRAZOLE 40 MG/1
40 CAPSULE, DELAYED RELEASE ORAL DAILY
Qty: 90 CAPSULE | Refills: 0 | Status: SHIPPED | OUTPATIENT
Start: 2019-10-21 | End: 2019-10-22 | Stop reason: SDUPTHER

## 2019-10-22 ENCOUNTER — OFFICE VISIT (OUTPATIENT)
Dept: BARIATRICS/WEIGHT MGMT | Facility: CLINIC | Age: 39
End: 2019-10-22

## 2019-10-22 VITALS
RESPIRATION RATE: 18 BRPM | OXYGEN SATURATION: 98 % | BODY MASS INDEX: 25.27 KG/M2 | HEIGHT: 64 IN | DIASTOLIC BLOOD PRESSURE: 68 MMHG | HEART RATE: 89 BPM | SYSTOLIC BLOOD PRESSURE: 110 MMHG | WEIGHT: 148 LBS | TEMPERATURE: 97.5 F

## 2019-10-22 DIAGNOSIS — Z98.84 S/P BARIATRIC SURGERY: ICD-10-CM

## 2019-10-22 DIAGNOSIS — K59.09 CHRONIC CONSTIPATION: ICD-10-CM

## 2019-10-22 DIAGNOSIS — K21.9 GASTROESOPHAGEAL REFLUX DISEASE, ESOPHAGITIS PRESENCE NOT SPECIFIED: Primary | ICD-10-CM

## 2019-10-22 PROCEDURE — 99214 OFFICE O/P EST MOD 30 MIN: CPT | Performed by: PHYSICIAN ASSISTANT

## 2019-10-22 RX ORDER — OMEPRAZOLE 40 MG/1
40 CAPSULE, DELAYED RELEASE ORAL DAILY
Qty: 90 CAPSULE | Refills: 1 | Status: SHIPPED | OUTPATIENT
Start: 2019-10-22 | End: 2019-12-23 | Stop reason: SDUPTHER

## 2019-10-22 RX ORDER — SENNOSIDES 8.6 MG
TABLET ORAL NIGHTLY
COMMUNITY

## 2019-10-22 NOTE — PROGRESS NOTES
[facility] Bariatric Surgery  [address]  [facility], located within [facility] [id number] [phone]      Patient Name:  Dusty Calhoun.  :  1980      Date of Visit: 10/22/2019      Reason for Visit:  routine f/up - 7 months postop    HPI:  Dusty Calhoun is a 39 y.o. female s/p LSG/HHR w/ Dr. Fish on 3/12/19    Feeling good.  Only issue is constipation, has been a lifelong issue, but now worse s/p LSG.  Following w/ PCP.  Taking Colace BID.  Uses Senna prn.  But says the only thing that really works is drinking 60mL MOM when she is on the verge of impaction.  Denies prior colonoscopy.  Denies prior GI eval, but thinks that may be the direction she needs to go.    Otherwise no concerns.  BP medication has been discontinued.  Still focuses on getting 60-90g prot/day.  Still drinking 1-2 protein shakes/day and snacks on beef jerky.  Continues on Omeprazole 40mg daily - feels she still needs it.  Labs 19 - WNL. Wearing patches: MVI, iron, B12, Vit D, biotin.  Hair loss much improved.  Exercising - 5x/week.  Lastly, asking about plastic surgery recommendations.       Presurgery weight: 238 pounds.  Today's weight is 67.1 kg (148 lb) pounds, today's  Body mass index is 25.81 kg/m²., and her weight loss since surgery is 90 pounds.       Past Medical History:   Diagnosis Date   • Abnormal ECG     followed by cardiology annually, negative workup up per patient.    • Abnormal glucose     Was on metformin for DM, d/c soda and A1C normalized.    • Anxiety    • Boil     h/o mulitple boils, never had I&D, no known MRSA   • Constipation     metaucil prn    • Dehydration 3/22/2019   • Depression    • Dyspepsia    • Fatigue    • Fatty liver     noted on US, confirmed during lap jessika (no Biopsy)   • Former smoker    • Hashimoto's disease     last labs showed euthyroid without meds   • Heartburn     rare, doesn't take meds typically.  No h/o EGD or h pylori.    • History of diabetes mellitus     at one  point needed Metformin until stopped drinking sugary sodas in 2017   • Hypertension    • Morbid obesity with BMI of 40.0-44.9, adult (CMS/[facility])    • Nausea 3/22/2019   • Nocturnal oxygen desaturation     uses 2 liters of oxygen at night    • Pain in shin    • PONV (postoperative nausea and vomiting)     nasuea only but never vomited    • Shortness of breath    • Suspected sleep apnea     Has home oxygen testing, was on ngihttime oxygen in the past, has yet to have sleep study.    • Thyroid goiter     Followed by US annually, no procedures to date.      Past Surgical History:   Procedure Laterality Date   • ENDOSCOPY     • ENDOSCOPY N/A 3/12/2019    Procedure: ESOPHAGOGASTRODUODENOSCOPY;  Surgeon: Joselin Fish MD;  Location:  [facility];  Service: Bariatric   • GASTRIC SLEEVE LAPAROSCOPIC N/A 3/12/2019    Procedure: GASTRIC SLEEVE LAPAROSCOPIC;  Surgeon: Joselin Fish MD;  Location:  [facility];  Service: Bariatric   • HIATAL HERNIA REPAIR N/A 3/12/2019    Procedure: HIATAL HERNIA REPAIR LAPAROSCOPIC;  Surgeon: Joselin Fish MD;  Location:  [facility];  Service: Bariatric   • LAPAROSCOPIC CHOLECYSTECTOMY  2014    cholelithiasis   • TUBAL ABDOMINAL LIGATION  2011    laparoscopic     Outpatient Medications Marked as Taking for the 10/22/19 encounter (Office Visit) with Fátima Tsang PA   Medication Sig Dispense Refill   • Cholecalciferol (VITAMIN D3) 5000 units capsule capsule Take 5,000 Units by mouth Daily.     • Cyanocobalamin (VITAMIN B-12) 500 MCG sublingual tablet Place 500 mcg under the tongue 3 (Three) Times a Day.     • docusate sodium (COLACE) 100 MG capsule Take 100 mg by mouth 2 (Two) Times a Day.     • IRON PO Take  by mouth.     • levothyroxine (SYNTHROID, LEVOTHROID) 50 MCG tablet Take 50 mcg by mouth Daily.     • Multiple Vitamins-Minerals (MULTIVITAMIN ADULT PO) Take  by mouth.     • omeprazole (priLOSEC) 40 MG capsule Take 1 capsule by mouth Daily. 90 capsule 1   •  "ondansetron ODT (ZOFRAN ODT) 8 MG disintegrating tablet Take 1 tablet by mouth Every 8 (Eight) Hours As Needed for Nausea or Vomiting. 21 tablet 0   • prochlorperazine (COMPAZINE) 5 MG tablet Take 1 tablet by mouth Every 6 (Six) Hours As Needed for Nausea or Vomiting. 30 tablet 0   • promethazine (PHENERGAN) 25 MG tablet Take 1 tablet by mouth Every 6 (Six) Hours As Needed for Nausea. 30 tablet 0   • senna (SENOKOT) 8.6 MG tablet tablet Take  by mouth Every Night.     • [DISCONTINUED] omeprazole (priLOSEC) 40 MG capsule Take 1 capsule by mouth Daily. 90 capsule 0     No Known Allergies    Social History     Socioeconomic History   • Marital status: Single     Spouse name: Not on file   • Number of children: 3   • Years of education: High School    • Highest education level: Not on file   Occupational History   • Occupation: Student   Tobacco Use   • Smoking status: Former Smoker     Packs/day: 2.00     Years: 15.00     Pack years: 30.00     Types: Cigarettes     Last attempt to quit:      Years since quittin.8   • Smokeless tobacco: Never Used   • Tobacco comment: no second hand smoke in the home    Substance and Sexual Activity   • Alcohol use: No   • Drug use: No   • Sexual activity: Defer     Birth control/protection: Surgical     Comment: no hormones   Social History Narrative    Lives in [facility] with 3 children.  Not working, in school for associates in Science and Art.        /68 (BP Location: Left arm, Patient Position: Sitting, Cuff Size: Adult)   Pulse 89   Temp 97.5 °F (36.4 °C) (Temporal)   Resp 18   Ht 161.3 cm (63.5\")   Wt 67.1 kg (148 lb)   SpO2 98%   BMI 25.81 kg/m²   Physical Exam   Constitutional: She appears well-developed and well-nourished.   Cardiovascular: Normal rate.   Pulmonary/Chest: Effort normal.   Musculoskeletal: Normal range of motion.   Neurological: She is alert.   Psychiatric: She has a normal mood and affect. Judgment and thought content normal. "       Assessment:   7 months s/p LSG/HHR w/ Dr. Fish on 3/12/19    ICD-10-CM ICD-9-CM   1. Gastroesophageal reflux disease, esophagitis presence not specified K21.9 530.81   2. Chronic constipation K59.09 564.00   3. S/P bariatric surgery Z98.84 V45.86       Plan:  Continue to focus on high protein, low carb, good food choices, healthy habits and routine exercise.  Continue current vitamin/patch regimen.  No labs today.  Will refer to GI for further eval of chronic constipation.  Contact information for Dr. Osborn in [place], Dr. Farias in [place] and also [facility] given to patient - encouraged to schedule a consultation to discuss options further.  Call w/ any other issues/concerns.    The patient was instructed to follow up in 5 months (for annual eval), sooner if needed.

## 2019-11-05 ENCOUNTER — OFFICE VISIT (OUTPATIENT)
Dept: GASTROENTEROLOGY | Facility: CLINIC | Age: 39
End: 2019-11-05

## 2019-11-05 ENCOUNTER — HOSPITAL ENCOUNTER (OUTPATIENT)
Dept: GENERAL RADIOLOGY | Facility: HOSPITAL | Age: 39
Discharge: HOME | End: 2019-11-05
Admitting: NURSE PRACTITIONER

## 2019-11-05 VITALS
OXYGEN SATURATION: 95 % | HEIGHT: 64 IN | TEMPERATURE: 98.3 F | BODY MASS INDEX: 25.23 KG/M2 | DIASTOLIC BLOOD PRESSURE: 80 MMHG | SYSTOLIC BLOOD PRESSURE: 140 MMHG | WEIGHT: 147.8 LBS | HEART RATE: 55 BPM

## 2019-11-05 DIAGNOSIS — K59.09 CHRONIC CONSTIPATION: Primary | ICD-10-CM

## 2019-11-05 DIAGNOSIS — Z90.3 HISTORY OF SLEEVE GASTRECTOMY: ICD-10-CM

## 2019-11-05 PROCEDURE — 74018 RADEX ABDOMEN 1 VIEW: CPT

## 2019-11-05 PROCEDURE — 99214 OFFICE O/P EST MOD 30 MIN: CPT | Performed by: NURSE PRACTITIONER

## 2019-11-05 RX ORDER — SODIUM, POTASSIUM,MAG SULFATES 17.5-3.13G
2 SOLUTION, RECONSTITUTED, ORAL ORAL ONCE
Qty: 2 BOTTLE | Refills: 0 | COMMUNITY
Start: 2019-11-05 | End: 2019-11-05

## 2019-11-05 NOTE — PROGRESS NOTES
GASTROENTEROLOGY OUTPATIENT NEW PATIENT NOTE  Patient: DUSTY CALHOUN : 1980  Date of Service: 2019  Dear Stephen Burton APRN   Thank you for your referral of this patient for evaluation of constipation  CC: Constipation  Assessment/Plan                                             ASSESSMENT & PLANS     Chronic constipation unclear of etiology.  Likely to be IBS and/or chronic idiopathic constipation  -     XR Abdomen KUB  -     Today, start bowel prep sodium-potassium-magnesium sulfates (SUPREP BOWEL PREP KIT) 17.5-3.13-1.6 GM/177ML solution oral solution; Take 2 bottles by mouth 1 (One) Time for 1 dose.  Sample given.  Pt is instructed   -     Tomorrow, start Plecanatide 3 MG tablet; Take 1 tablet by mouth Daily. Take with or without food.  · Colonoscopy if sx persists despite medical mgt    Hx of sleeve gastrectomy and hiatal hernia repair 3/12/19     Follow Up:Return in about 1 month (around 2019).      DISCUSSION: The above plan was delineated in details with patient and all questions and concerns were answered.  Patient is also given contact information.  Patient is to return as scheduled or sooner if new problems arise  Subjective                                                     SUBJECTIVE   History of Present Illness  Ms. Dusty Calhoun is a 39 y.o. female who presents to the clinic today for an evaluation of Constipation  Chronic constipation (lifelong issue), but worsen since gastric sleeve Dr. Fish on 3/12/19   BM once every week or every 2 weeks  Occasionally, pt has a BM 2-3 times a wk.  That is good week for her  Pt reports of having to strain, hard/lumpy stool, tenesmus, and incomplete emptying. Reports of sometimes sitting in unusual position to facilitate defecation. Painful BM.    Has thyroid disease, but controlled w/ Synthroid per pt. No long has DM since gastric sleeve  Not taking oral iron.  No hx of opiates.  Not on calcium channel blocker.  No hx of prior bowel  obstruction or chronic laxative abuse.  On iron and other vitamin patch  Daily Colace and PRN Senna and MOM. The only thing that really works is drinking 60mL MOM when she is on the verge of impaction.    No prior colonoscopy or GI eval  Hx of ccy and tubal ligation     EGD, done on 7/26/18 by Dr Cadena, showed hiatal hernia, reflux esophagitis, but normal stomach and duodenum    ROS:Review of Systems   Constitutional: Negative for activity change, appetite change, fatigue, fever and unexpected weight change.   HENT: Negative for hearing loss, trouble swallowing and voice change.    Eyes: Negative for visual disturbance.   Respiratory: Negative for cough, choking, chest tightness and shortness of breath.    Cardiovascular: Negative for chest pain.   Gastrointestinal: Positive for abdominal distention (bloating), abdominal pain, anal bleeding, constipation, nausea and rectal pain. Negative for blood in stool, diarrhea and vomiting.   Endocrine: Negative for polydipsia and polyphagia.   Genitourinary: Positive for vaginal bleeding.   Musculoskeletal: Negative for gait problem and joint swelling.   Skin: Negative for color change and rash.   Allergic/Immunologic: Negative for food allergies.   Neurological: Negative for dizziness, seizures and speech difficulty.   Hematological: Negative for adenopathy.   Psychiatric/Behavioral: Negative for confusion.     Subjective   PAST MED HX: Pt  has a past medical history of Abnormal ECG, Abnormal glucose, Anxiety, Boil, Constipation, Dehydration (3/22/2019), Depression, Dyspepsia, Fatigue, Fatty liver, Former smoker, Hashimoto's disease, Heartburn, History of diabetes mellitus, Hypertension, Morbid obesity with BMI of 40.0-44.9, adult (CMS/HCC), Nausea (3/22/2019), Nocturnal oxygen desaturation, Pain in shin, PONV (postoperative nausea and vomiting), Shortness of breath, Suspected sleep apnea, and Thyroid goiter.  PAST SURG HX: Pt  has a past surgical history that includes  Tubal ligation (2011); Laparoscopic cholecystectomy (2014); Esophagogastroduodenoscopy; Gastric Sleeve (N/A, 3/12/2019); Esophagogastroduodenoscopy (N/A, 3/12/2019); Hiatal hernia repair (N/A, 3/12/2019); and Bariatric Surgery.  FAM HX: family history includes Breast cancer in her paternal grandmother; Diabetes in her maternal grandmother; Heart attack in her maternal grandfather; Hypertension in her father and mother; Obesity in her maternal grandmother.  SOC HX: Pt  reports that she quit smoking about 8 years ago. Her smoking use included cigarettes. She has a 30.00 pack-year smoking history. She has never used smokeless tobacco. She reports that she does not drink alcohol or use drugs.  Objective                                                           OBJECTIVE   Allergy: Pt has No Known Allergies.  MEDS: •  Cholecalciferol (VITAMIN D3) 5000 units capsule capsule, Take 5,000 Units by mouth Daily. patch, Disp: , Rfl:   •  Cyanocobalamin (VITAMIN B-12) 500 MCG sublingual tablet, Place 500 mcg under the tongue 3 (Three) Times a Day. patch, Disp: , Rfl:   •  docusate sodium (COLACE) 100 MG capsule, Take 100 mg by mouth 2 (Two) Times a Day., Disp: , Rfl:   •  IRON PO, Take  by mouth. patch, Disp: , Rfl:   •  levothyroxine (SYNTHROID, LEVOTHROID) 50 MCG tablet, Take 50 mcg by mouth Daily., Disp: , Rfl:   •  Multiple Vitamins-Minerals (MULTIVITAMIN ADULT PO), Take  by mouth. patch, Disp: , Rfl:   •  omeprazole (priLOSEC) 40 MG capsule, Take 1 capsule by mouth Daily., Disp: 90 capsule, Rfl: 1  •  ondansetron ODT (ZOFRAN ODT) 8 MG disintegrating tablet, Take 1 tablet by mouth Every 8 (Eight) Hours As Needed for Nausea or Vomiting., Disp: 21 tablet, Rfl: 0  •  prochlorperazine (COMPAZINE) 5 MG tablet, Take 1 tablet by mouth Every 6 (Six) Hours As Needed for Nausea or Vomiting., Disp: 30 tablet, Rfl: 0  •  promethazine (PHENERGAN) 25 MG tablet, Take 1 tablet by mouth Every 6 (Six) Hours As Needed for Nausea., Disp: 30  tablet, Rfl: 0  •  senna (SENOKOT) 8.6 MG tablet tablet, Take  by mouth PRN Every Night., Disp: , Rfl:     Lab Results   Component Value Date    WBC 9.0 07/16/2019    WBC 8.3 04/15/2019    WBC 10.9 (H) 03/22/2019    EOSABS 0.0 07/16/2019    EOSABS 0.1 04/15/2019    EOSABS 0.1 03/22/2019     07/16/2019     04/15/2019     (H) 03/22/2019     Lab Results   Component Value Date    HGB 13.7 07/16/2019    HGB 14.2 04/15/2019    HGB 14.9 03/22/2019    HCT 41.4 07/16/2019    HCT 42.3 04/15/2019    HCT 42.9 03/22/2019     Lab Results   Component Value Date    MCV 90 07/16/2019    MCV 87 04/15/2019    MCV 85 03/22/2019    RDW 13.9 07/16/2019    RDW 15.3 04/15/2019    RDW 13.9 03/22/2019    MCHC 33.1 07/16/2019    MCHC 33.6 04/15/2019    MCHC 34.7 03/22/2019    FERRITIN 113 07/16/2019    FERRITIN 158 (H) 04/15/2019     Lab Results   Component Value Date     07/16/2019    K 4.1 07/16/2019     07/16/2019    CO2 20 07/16/2019    BUN 18 07/16/2019    CREATININE 0.76 07/16/2019    GLUCOSE 84 03/15/2019    CALCIUM 9.6 07/16/2019    ANIONGAP 9.0 03/15/2019     Lab Results   Component Value Date    AST 12 07/16/2019    AST 15 04/15/2019    AST 20 03/22/2019    ALT 14 07/16/2019    ALT 17 04/15/2019    ALT 30 03/22/2019    ALKPHOS 64 07/16/2019    ALKPHOS 64 04/15/2019    ALKPHOS 70 03/22/2019    BILITOT 0.5 07/16/2019    BILITOT 0.8 04/15/2019    BILITOT 0.4 03/22/2019    ALBUMIN 4.4 07/16/2019    ALBUMIN 4.7 04/15/2019    ALBUMIN 4.8 03/22/2019     Lab Results   Component Value Date    LIPASE 89 (H) 03/22/2019     Lab Results   Component Value Date    HGBA1C 5.40 03/10/2019    HGBA1C 5.5 05/22/2018    TSH 2.710 07/16/2019    TSH 3.070 04/15/2019    TSH 96.100 (H) 05/22/2018     Lab Results   Component Value Date    HPYLORIBR Negative 05/22/2018     Wt Readings from Last 5 Encounters:   11/05/19 67 kg (147 lb 12.8 oz)   10/22/19 67.1 kg (148 lb)   07/16/19 78 kg (172 lb)   04/15/19 92.8 kg (204 lb 8 oz)    03/22/19 102 kg (224 lb)   body mass index is 25.77 kg/m².,Temp: 98.3 °F (36.8 °C),BP: 140/80,Heart Rate: 55   Physical Exam  General Well developed; well nourished. NAD   ENT Anicteric sclerae. Oral mucosa pink and moist without thrush or lesions    Neck Neck supple; trachea midline. No thyromegaly   Resp CTA. Respiration effort normal  CV RRR. No M/R/G. No lower extremity edema  GI Abd soft, NT, ND, hypo-active bowel sounds.  No HSM.  No abd hernia  Musc No clubbing; no cyanosis.  Gait steady  Skin No rash; no lesions; no bruises.  Skin warm to touch.  Psych Oriented to time, place, and person.  Appropriate affect    Thank you kindly for allowing me to be part of this patient’s care.    Pt care team: Mara GARCIA & EVELYN Mosher  11/05/19 10:52 AM  [facility]--Gastroenterology  [phone]    CC: Stephen Burton, RADHA  [address] [facility] [id number] FAX:[phone]

## 2019-11-07 ENCOUNTER — TELEPHONE (OUTPATIENT)
Dept: GASTROENTEROLOGY | Facility: CLINIC | Age: 39
End: 2019-11-07

## 2019-11-07 DIAGNOSIS — K59.09 CHRONIC CONSTIPATION: Primary | ICD-10-CM

## 2019-11-07 NOTE — TELEPHONE ENCOUNTER
----- Message from RADHA Justin sent at 11/7/2019  1:17 PM EST -----  Pj Maharaj let pt know that abdominal X-ray shows stools, consistent w/ constipation.  There is no bowel blockage.  Take laxatives as discussed.

## 2019-11-08 RX ORDER — LACTULOSE 20 G/30ML
SOLUTION ORAL
Qty: 1 BOTTLE | Refills: 2 | Status: SHIPPED | OUTPATIENT
Start: 2019-11-08

## 2019-11-08 NOTE — TELEPHONE ENCOUNTER
I called patient regarding Trulance pa denial. Lactulose has been sent to pharmacy. No answer; left voice message.

## 2019-12-26 RX ORDER — OMEPRAZOLE 40 MG/1
40 CAPSULE, DELAYED RELEASE ORAL DAILY
Qty: 90 CAPSULE | Refills: 1 | Status: SHIPPED | OUTPATIENT
Start: 2019-12-26 | End: 2020-04-18 | Stop reason: SDUPTHER

## 2020-01-14 ENCOUNTER — OFFICE VISIT (OUTPATIENT)
Dept: GASTROENTEROLOGY | Facility: CLINIC | Age: 40
End: 2020-01-14

## 2020-01-14 VITALS
HEART RATE: 98 BPM | DIASTOLIC BLOOD PRESSURE: 86 MMHG | BODY MASS INDEX: 24.31 KG/M2 | SYSTOLIC BLOOD PRESSURE: 142 MMHG | WEIGHT: 142.4 LBS | HEIGHT: 64 IN

## 2020-01-14 DIAGNOSIS — K59.00 CONSTIPATION, UNSPECIFIED CONSTIPATION TYPE: Primary | ICD-10-CM

## 2020-01-14 PROCEDURE — 99214 OFFICE O/P EST MOD 30 MIN: CPT | Performed by: NURSE PRACTITIONER

## 2020-01-14 RX ORDER — LORATADINE 10 MG/1
1 TABLET ORAL DAILY
COMMUNITY
Start: 2019-11-19

## 2020-01-14 RX ORDER — LISINOPRIL 10 MG/1
1 TABLET ORAL DAILY
COMMUNITY
Start: 2019-11-14

## 2020-01-14 NOTE — PROGRESS NOTES
"GASTROENTEROLOGY OFFICE NOTE  Dusty Calhoun  [id number]  1980    CARE TEAM  Patient Care Team:  Stephen Maier APRN as PCP - General (Family Medicine)    Referring Provider: Stephen Maier APRN    Chief Complaint   Patient presents with   • Constipation        HISTORY OF PRESENT ILLNESS:  The patient is a 39-year-old female who presents today in follow-up for constipation.  She was prescribed lactulose at her last visit that she reports she could not tolerate, she reports it is too much fluid in her stomach, status post gastric sleeve surgery in March 2019.    She was given samples of Trulance at one point and did not provide much if any relief.  She has over time, used senna, Dulcolax, milk of magnesia, and MiraLAX with continued problems.  She describes her constipation problem is going about a week without a bowel movement and then having to strain significantly to pass stool.  She has had impactions in the past.  At times when she feels very constipated she can take 60 mL of milk of magnesia which will eventually give her a \"blowout\".    PAST MEDICAL HISTORY  Past Medical History:   Diagnosis Date   • Abnormal ECG     followed by cardiology annually, negative workup up per patient.    • Abnormal glucose     Was on metformin for DM, d/c soda and A1C normalized.    • Anxiety    • Boil     h/o mulitple boils, never had I&D, no known MRSA   • Constipation     metaucil prn    • Dehydration 3/22/2019   • Depression    • Dyspepsia    • Fatigue    • Fatty liver     noted on US, confirmed during lap jessika (no Biopsy)   • Former smoker    • Hashimoto's disease     last labs showed euthyroid without meds   • Heartburn     rare, doesn't take meds typically.  No h/o EGD or h pylori.    • History of diabetes mellitus     at one point needed Metformin until stopped drinking sugary sodas in 2017   • Hypertension    • Morbid obesity with BMI of 40.0-44.9, adult (CMS/HCC)    • Nausea 3/22/2019   • Nocturnal oxygen " desaturation     uses 2 liters of oxygen at night    • Pain in shin    • PONV (postoperative nausea and vomiting)     nasuea only but never vomited    • Shortness of breath    • Suspected sleep apnea     Has home oxygen testing, was on ngihttime oxygen in the past, has yet to have sleep study.    • Thyroid goiter     Followed by US annually, no procedures to date.         PAST SURGICAL HISTORY  Past Surgical History:   Procedure Laterality Date   • BARIATRIC SURGERY     • ENDOSCOPY     • ENDOSCOPY N/A 3/12/2019    Procedure: ESOPHAGOGASTRODUODENOSCOPY;  Surgeon: Joselin Fish MD;  Location:  [facility];  Service: Bariatric   • GASTRIC SLEEVE LAPAROSCOPIC N/A 3/12/2019    Procedure: GASTRIC SLEEVE LAPAROSCOPIC;  Surgeon: Joselin Fish MD;  Location:  [facility];  Service: Bariatric   • HIATAL HERNIA REPAIR N/A 3/12/2019    Procedure: HIATAL HERNIA REPAIR LAPAROSCOPIC;  Surgeon: Joselin Fish MD;  Location:  [facility];  Service: Bariatric   • LAPAROSCOPIC CHOLECYSTECTOMY  2014    cholelithiasis   • TUBAL ABDOMINAL LIGATION  2011    laparoscopic        MEDICATIONS:    Current Outpatient Medications:   •  Cholecalciferol (VITAMIN D3) 5000 units capsule capsule, Take 5,000 Units by mouth Daily. patch, Disp: , Rfl:   •  Cyanocobalamin (VITAMIN B-12) 500 MCG sublingual tablet, Place 500 mcg under the tongue 3 (Three) Times a Day. patch, Disp: , Rfl:   •  docusate sodium (COLACE) 100 MG capsule, Take 100 mg by mouth 2 (Two) Times a Day., Disp: , Rfl:   •  IRON PO, Take  by mouth. patch, Disp: , Rfl:   •  lactulose 20 GM/30ML solution solution, Take 45 mL by mouth 3 times a day.  Adjust dose to have 2-3 soft loose stools/day.  May mix with fruit juice, water or milk, Disp: 1 bottle, Rfl: 2  •  levothyroxine (SYNTHROID, LEVOTHROID) 50 MCG tablet, Take 50 mcg by mouth Daily., Disp: , Rfl:   •  lisinopril (PRINIVIL,ZESTRIL) 10 MG tablet, Take 1 tablet by mouth Daily., Disp: , Rfl:   •  loratadine  (CLARITIN) 10 MG tablet, Take 1 tablet by mouth Daily., Disp: , Rfl:   •  Multiple Vitamins-Minerals (MULTIVITAMIN ADULT PO), Take  by mouth. patch, Disp: , Rfl:   •  omeprazole (priLOSEC) 40 MG capsule, Take 1 capsule by mouth Daily., Disp: 90 capsule, Rfl: 1  •  ondansetron ODT (ZOFRAN ODT) 8 MG disintegrating tablet, Take 1 tablet by mouth Every 8 (Eight) Hours As Needed for Nausea or Vomiting., Disp: 21 tablet, Rfl: 0  •  Plecanatide 3 MG tablet, Take 1 tablet by mouth Daily. Take with or without food., Disp: 90 tablet, Rfl: 2  •  prochlorperazine (COMPAZINE) 5 MG tablet, Take 1 tablet by mouth Every 6 (Six) Hours As Needed for Nausea or Vomiting., Disp: 30 tablet, Rfl: 0  •  promethazine (PHENERGAN) 25 MG tablet, Take 1 tablet by mouth Every 6 (Six) Hours As Needed for Nausea., Disp: 30 tablet, Rfl: 0  •  senna (SENOKOT) 8.6 MG tablet tablet, Take  by mouth Every Night., Disp: , Rfl:     ALLERGIES  No Known Allergies    FAMILY HISTORY:  Family History   Problem Relation Age of Onset   • Hypertension Mother    • Hypertension Father    • Diabetes Maternal Grandmother    • Obesity Maternal Grandmother    • Heart attack Maternal Grandfather    • Breast cancer Paternal Grandmother        SOCIAL HISTORY  Social History     Socioeconomic History   • Marital status: Single     Spouse name: Not on file   • Number of children: 3   • Years of education: High School    • Highest education level: Not on file   Occupational History   • Occupation: Student   Tobacco Use   • Smoking status: Former Smoker     Packs/day: 2.00     Years: 15.00     Pack years: 30.00     Types: Cigarettes     Last attempt to quit:      Years since quittin.0   • Smokeless tobacco: Never Used   • Tobacco comment: no second hand smoke in the home    Substance and Sexual Activity   • Alcohol use: No   • Drug use: No   • Sexual activity: Defer     Birth control/protection: Surgical     Comment: no hormones   Social History Narrative    Lives in  [place] with 3 children.  Not working, in school for associates in Science and Art.        REVIEW OF SYSTEMS  Review of Systems   Constitutional: Negative for activity change, appetite change, chills, diaphoresis, fatigue, fever, unexpected weight gain and unexpected weight loss.   HENT: Negative for trouble swallowing and voice change.    Gastrointestinal: Positive for abdominal distention, abdominal pain and constipation. Negative for anal bleeding, blood in stool, diarrhea, nausea, rectal pain, vomiting, GERD and indigestion.         PHYSICAL EXAM   /86 (BP Location: Right arm, Patient Position: Sitting, Cuff Size: Adult)   Pulse 98   Ht 161.3 cm (63.5\")   Wt 64.6 kg (142 lb 6.4 oz)   BMI 24.83 kg/m²   Physical Exam   Constitutional: She is oriented to person, place, and time. She appears well-developed and well-nourished.   HENT:   Head: Normocephalic.   Mouth/Throat: Oropharynx is clear and moist.   Eyes: Pupils are equal, round, and reactive to light. EOM are normal.   Neck: Normal range of motion. Neck supple.   Cardiovascular: Normal rate and regular rhythm.   Pulmonary/Chest: Effort normal and breath sounds normal. She has no wheezes. She has no rales.   Abdominal: Soft. Bowel sounds are normal. She exhibits no mass. There is no tenderness. There is no rebound and no guarding. No hernia.   Musculoskeletal: Normal range of motion.   Neurological: She is alert and oriented to person, place, and time. No cranial nerve deficit.   Skin: Skin is warm and dry.   Psychiatric: She has a normal mood and affect. Her behavior is normal. Judgment normal.   Nursing note and vitals reviewed.    Results Review:  [facility] records reviewed and discussed with patient.     ASSESSMENT / PLAN  1.  Constipation  - Trial of Motegrity 2 mg daily.  -Consider milk of magnesia 15 mL-30 mL daily    Return in about 6 weeks (around 2/25/2020) for Follow Up w/ Mara Jorge.    I discussed the patients findings and my " recommendations with patient    Nahum Ledezma, APRN

## 2020-04-16 ENCOUNTER — TELEPHONE (OUTPATIENT)
Dept: GASTROENTEROLOGY | Facility: CLINIC | Age: 40
End: 2020-04-16

## 2020-04-16 NOTE — TELEPHONE ENCOUNTER
I CALLED PATIENT REGARDING NO SHOW ON 4/15/2020 AT 11:40AM WITH SUKUMAR. NO ANSWER; LEFT VOICE MESSAGE.

## 2020-04-20 RX ORDER — OMEPRAZOLE 40 MG/1
40 CAPSULE, DELAYED RELEASE ORAL DAILY
Qty: 90 CAPSULE | Refills: 1 | Status: SHIPPED | OUTPATIENT
Start: 2020-04-20

## 2021-03-23 ENCOUNTER — BULK ORDERING (OUTPATIENT)
Dept: CASE MANAGEMENT | Facility: OTHER | Age: 41
End: 2021-03-23

## 2021-03-23 DIAGNOSIS — Z23 IMMUNIZATION DUE: ICD-10-CM

## (undated) DEVICE — ENDOPATH XCEL BLADELESS TROCARS WITH STABILITY SLEEVES: Brand: ENDOPATH XCEL

## (undated) DEVICE — FLTR PLUMEPORT LAP W/CONN STRL

## (undated) DEVICE — SKIN AFFIX SURG ADHESIVE 72/CS 0.55ML: Brand: MEDLINE

## (undated) DEVICE — TISSUE RETRIEVAL SYSTEM: Brand: INZII RETRIEVAL SYSTEM

## (undated) DEVICE — UNDRPD COMFRT GLD DRYPAD 36X57IN

## (undated) DEVICE — DRN PENRS 1/2X18IN LTX

## (undated) DEVICE — ENDOPATH XCEL UNIVERSAL TROCAR STABLILITY SLEEVES: Brand: ENDOPATH XCEL

## (undated) DEVICE — SUT SILK 0 SH 30IN K834H

## (undated) DEVICE — MARYLAND JAW LAPAROSCOPIC SEALER/DIVIDER COATED: Brand: LIGASURE

## (undated) DEVICE — APL DUPLOSPRAYER MIS 40CM

## (undated) DEVICE — COVER,LIGHT HANDLE,FLX,1/PK: Brand: MEDLINE INDUSTRIES, INC.

## (undated) DEVICE — POWER SHELL: Brand: SIGNIA

## (undated) DEVICE — Device: Brand: DEFENDO AIR/WATER/SUCTION AND BIOPSY VALVE

## (undated) DEVICE — 3 RING SUTURE PASSER - 16 CM: Brand: 3 RING SUTURE PASSER - 16 CM

## (undated) DEVICE — [HIGH FLOW INSUFFLATOR,  DO NOT USE IF PACKAGE IS DAMAGED,  KEEP DRY,  KEEP AWAY FROM SUNLIGHT,  PROTECT FROM HEAT AND RADIOACTIVE SOURCES.]: Brand: PNEUMOSURE

## (undated) DEVICE — GLV SURG SENSICARE MICRO PF LF 6.5 STRL

## (undated) DEVICE — ANTIBACTERIAL VIOLET BRAIDED (POLYGLACTIN 910), SYNTHETIC ABSORBABLE SUTURE: Brand: COATED VICRYL

## (undated) DEVICE — CONTN GRAD MEAS TRIANG 32OZ BLK

## (undated) DEVICE — PK BARIATRIC 10

## (undated) DEVICE — GLV SURG DERMASSURE GRN LF PF 7.0

## (undated) DEVICE — SHT AIR TRANSFR COMFRT GLIDE LT LAT 40X80IN